# Patient Record
Sex: FEMALE | Race: WHITE | NOT HISPANIC OR LATINO
[De-identification: names, ages, dates, MRNs, and addresses within clinical notes are randomized per-mention and may not be internally consistent; named-entity substitution may affect disease eponyms.]

---

## 2023-08-28 PROBLEM — Z80.3 FAMILY HISTORY OF BREAST CANCER: Status: ACTIVE | Noted: 2023-08-28

## 2023-08-28 PROBLEM — Z87.19 HISTORY OF HIATAL HERNIA: Status: RESOLVED | Noted: 2023-08-28 | Resolved: 2023-08-28

## 2023-08-28 PROBLEM — G35 HISTORY OF MULTIPLE SCLEROSIS: Status: RESOLVED | Noted: 2023-08-28 | Resolved: 2023-08-28

## 2023-08-28 PROBLEM — Z80.8 FAMILY HISTORY OF THYROID CANCER: Status: ACTIVE | Noted: 2023-08-28

## 2023-08-28 PROBLEM — Z80.42 FAMILY HISTORY OF PROSTATE CANCER: Status: ACTIVE | Noted: 2023-08-28

## 2023-08-28 PROBLEM — Z86.79 HISTORY OF HYPERTENSION: Status: RESOLVED | Noted: 2023-08-28 | Resolved: 2023-08-28

## 2023-08-28 PROBLEM — Z80.0 FAMILY HISTORY OF COLON CANCER: Status: ACTIVE | Noted: 2023-08-28

## 2023-08-28 PROBLEM — Z86.018 HISTORY OF CAVERNOMA: Status: RESOLVED | Noted: 2023-08-28 | Resolved: 2023-08-28

## 2023-08-28 PROBLEM — Z86.39 HISTORY OF HYPERLIPIDEMIA: Status: RESOLVED | Noted: 2023-08-28 | Resolved: 2023-08-28

## 2023-08-28 PROBLEM — Z00.00 ENCOUNTER FOR PREVENTIVE HEALTH EXAMINATION: Status: ACTIVE | Noted: 2023-08-28

## 2023-08-28 PROBLEM — K57.90 DIVERTICULOSIS: Status: RESOLVED | Noted: 2023-08-28 | Resolved: 2023-08-28

## 2023-08-28 PROBLEM — Z86.79 HISTORY OF CORONARY ARTERY DISEASE: Status: RESOLVED | Noted: 2023-08-28 | Resolved: 2023-08-28

## 2023-08-29 ENCOUNTER — APPOINTMENT (OUTPATIENT)
Dept: HEMATOLOGY ONCOLOGY | Facility: CLINIC | Age: 63
End: 2023-08-29
Payer: COMMERCIAL

## 2023-08-29 VITALS
TEMPERATURE: 96.6 F | OXYGEN SATURATION: 100 % | WEIGHT: 237 LBS | BODY MASS INDEX: 40.46 KG/M2 | HEIGHT: 64 IN | DIASTOLIC BLOOD PRESSURE: 76 MMHG | RESPIRATION RATE: 18 BRPM | SYSTOLIC BLOOD PRESSURE: 119 MMHG | HEART RATE: 60 BPM

## 2023-08-29 DIAGNOSIS — Z82.61 FAMILY HISTORY OF ARTHRITIS: ICD-10-CM

## 2023-08-29 DIAGNOSIS — Z86.79 PERSONAL HISTORY OF OTHER DISEASES OF THE CIRCULATORY SYSTEM: ICD-10-CM

## 2023-08-29 DIAGNOSIS — Z80.42 FAMILY HISTORY OF MALIGNANT NEOPLASM OF PROSTATE: ICD-10-CM

## 2023-08-29 DIAGNOSIS — K57.90 DIVERTICULOSIS OF INTESTINE, PART UNSPECIFIED, W/OUT PERFORATION OR ABSCESS W/OUT BLEEDING: ICD-10-CM

## 2023-08-29 DIAGNOSIS — Z80.3 FAMILY HISTORY OF MALIGNANT NEOPLASM OF BREAST: ICD-10-CM

## 2023-08-29 DIAGNOSIS — G35 MULTIPLE SCLEROSIS: ICD-10-CM

## 2023-08-29 DIAGNOSIS — Z86.018 PERSONAL HISTORY OF OTHER BENIGN NEOPLASM: ICD-10-CM

## 2023-08-29 DIAGNOSIS — Z87.19 PERSONAL HISTORY OF OTHER DISEASES OF THE DIGESTIVE SYSTEM: ICD-10-CM

## 2023-08-29 DIAGNOSIS — Z86.39 PERSONAL HISTORY OF OTHER ENDOCRINE, NUTRITIONAL AND METABOLIC DISEASE: ICD-10-CM

## 2023-08-29 DIAGNOSIS — Z80.0 FAMILY HISTORY OF MALIGNANT NEOPLASM OF DIGESTIVE ORGANS: ICD-10-CM

## 2023-08-29 DIAGNOSIS — Z80.8 FAMILY HISTORY OF MALIGNANT NEOPLASM OF OTHER ORGANS OR SYSTEMS: ICD-10-CM

## 2023-08-29 PROCEDURE — 99205 OFFICE O/P NEW HI 60 MIN: CPT

## 2023-08-29 RX ORDER — FLUTICASONE PROPIONATE 50 UG/1
50 SPRAY, METERED NASAL DAILY
Qty: 1 | Refills: 1 | Status: ACTIVE | COMMUNITY
Start: 2023-08-29

## 2023-08-29 RX ORDER — ESOMEPRAZOLE MAGNESIUM 40 MG/1
40 CAPSULE, DELAYED RELEASE ORAL DAILY
Refills: 0 | Status: ACTIVE | COMMUNITY
Start: 2023-08-29

## 2023-08-29 RX ORDER — TICAGRELOR 90 MG/1
90 TABLET ORAL TWICE DAILY
Refills: 0 | Status: ACTIVE | COMMUNITY
Start: 2023-08-29

## 2023-08-29 RX ORDER — TELMISARTAN 20 MG/1
20 TABLET ORAL DAILY
Refills: 0 | Status: ACTIVE | COMMUNITY
Start: 2023-08-29

## 2023-08-29 RX ORDER — MULTIVIT-MIN/FOLIC/VIT K/LYCOP 400-300MCG
1000 TABLET ORAL DAILY
Refills: 0 | Status: ACTIVE | COMMUNITY
Start: 2023-08-29

## 2023-08-29 RX ORDER — ASPIRIN ENTERIC COATED TABLETS 81 MG 81 MG/1
81 TABLET, DELAYED RELEASE ORAL DAILY
Refills: 0 | Status: ACTIVE | COMMUNITY
Start: 2023-08-29

## 2023-08-29 RX ORDER — ZINC OXIDE 13 %
CREAM (GRAM) TOPICAL
Refills: 0 | Status: ACTIVE | COMMUNITY
Start: 2023-08-29

## 2023-08-29 RX ORDER — FOLIC ACID 1 MG/1
1 TABLET ORAL DAILY
Refills: 0 | Status: ACTIVE | COMMUNITY
Start: 2023-08-29

## 2023-08-29 RX ORDER — MULTIVIT-MIN/FOLIC/VIT K/LYCOP 400-300MCG
25 MCG TABLET ORAL DAILY
Refills: 0 | Status: ACTIVE | COMMUNITY
Start: 2023-08-29

## 2023-08-29 RX ORDER — UBIDECARENONE 200 MG
200 CAPSULE ORAL DAILY
Refills: 0 | Status: ACTIVE | COMMUNITY
Start: 2023-08-29

## 2023-08-29 RX ORDER — NICOTINE POLACRILEX 2 MG
1 GUM BUCCAL
Refills: 0 | Status: ACTIVE | COMMUNITY
Start: 2023-08-29

## 2023-08-29 RX ORDER — PRAVASTATIN SODIUM 80 MG/1
80 TABLET ORAL DAILY
Refills: 0 | Status: ACTIVE | COMMUNITY
Start: 2023-08-29

## 2023-08-29 RX ORDER — METOPROLOL SUCCINATE 25 MG/1
25 TABLET, EXTENDED RELEASE ORAL DAILY
Refills: 0 | Status: ACTIVE | COMMUNITY
Start: 2023-08-29

## 2023-08-29 RX ORDER — MINOXIDIL 2 %
2 SOLUTION, NON-ORAL TOPICAL
Refills: 0 | Status: ACTIVE | COMMUNITY
Start: 2023-08-29

## 2023-08-29 NOTE — HISTORY OF PRESENT ILLNESS
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 Edwards is a 63 year old female with a history of multiple sclerosis, HTN, HLD, CAD s/p stent Jan 2022, appendicitis, diverticulitis presents for an initial consultation for melanoma of the right ankle.  She has had no prior history of skin cancer.  She had a right ankle lesion for 12 years which became flat pink and then later started to raise which was bothering her when wearing footwear.  On 2/16/23, she had a shave biopsy of the right ankle which was positive for melanoma, Breslow thickness 1.6 mm, mitotic index less than 1/mm2.  On 4/6/23, she subsequently underwent wide local excision and SLNB with Dr. Mark Villaseñor at [facility]. 2 out of 2 sentinel right inguinal lymph nodes were positive with extranodal extension. No residual melanoma was identified.  PETCT on 5/6/23 showed likely post-surgical changes in the right foot bridge, right inguinal region and a hypermetabolic 1 cm right thyroid nodule.  MR of the brain on 6/13/23 showed indeterminate left parietal scalp lesion and hematoma of the right frontal lobe, however underlying melanoma could not be excluded.  The right thyroid nodule and left parietal lobe lesion were biopsied and were benign.  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  Repeat PETCT on August 14, 2023 showed no new suspicious lesions.  She has been followed by [facility] Dr. Marline Swenson at [facility]. She deferred adjuvant systemic therapy due to concern of side effects, especially bleeds, heart function.  Referred by Dr. Yari Miranda for second opinion.   She works as . 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 Facial numbness, weakness in hands from MS, no treatment  Jan 2022 cardiac stent April 2022 appendicitis treated with ABX Diverticulitis - treated with abx, developed fever, admitted with IV ABX, complicated by c diff, for which she was admitted again.  Family history of breast cancer - aunt, prostate cancer - father, colon cancer - grandmother, thyroid cancer - sister

## 2023-08-29 NOTE — PHYSICAL EXAM
[Fully active, able to carry on all pre-disease performance without restriction] : Status 0 - Fully active, able to carry on all pre-disease performance without restriction [Normal] : affect appropriate [de-identified] : Well healed surgical scar in the right ankle

## 2023-08-29 NOTE — ASSESSMENT
[FreeTextEntry1] : This is a 63 year old female who is now IVAN from a T2aN2a, stage IIIA, melanoma arising from the right ankle since 04/06/2023.  Her tumor was VE1 positive.    I had a long discussion with the patient and her  regarding her complex medical history, her melanoma history, and management options.  I discussed the nuances of stage IIIA melanoma and how distinct pathologic findings may modify our consideration of adjuvant therapy.  I reviewed the data supporting one year of adjuvant anti-PD1 therapy or one year of adjuvant dabrafenib and trametinib in this setting.   Despite her history of MS, the use of checkpoint blockade is not absolutely contraindicated and, certainly in the metastatic setting, would be a reasonable consideration.  In the adjuvant setting, my bias would be for dabrafenib and trametinib should she choose to pursue active therapy.  I reviewed the side effect profile of this regimen and the use of dose holds or modifications to mitigate any adverse events.  After a long discussion, all of her questions and concerns were answered to her satisfaction.  She expressed interest in proceeding with adjuvant dabrafenib and trametinib and will be seeing Dr. Swenson in the near future.  She knows she is always welcome to return to this clinic should she have any questions or concerns in the future.

## 2023-09-14 ENCOUNTER — RESULT REVIEW (OUTPATIENT)
Age: 63
End: 2023-09-14

## 2023-09-15 ENCOUNTER — NON-APPOINTMENT (OUTPATIENT)
Age: 63
End: 2023-09-15

## 2023-09-15 ENCOUNTER — RESULT REVIEW (OUTPATIENT)
Age: 63
End: 2023-09-15

## 2023-09-19 ENCOUNTER — APPOINTMENT (OUTPATIENT)
Dept: HEMATOLOGY ONCOLOGY | Facility: CLINIC | Age: 63
End: 2023-09-19
Payer: COMMERCIAL

## 2023-09-19 PROCEDURE — 99215 OFFICE O/P EST HI 40 MIN: CPT | Mod: 95

## 2023-09-19 RX ORDER — GLUCOSA SU 2KCL/CHONDROITIN SU 500-400 MG
500 CAPSULE ORAL DAILY
Refills: 0 | Status: DISCONTINUED | COMMUNITY
Start: 2023-08-29 | End: 2023-09-19

## 2023-09-25 ENCOUNTER — TRANSCRIPTION ENCOUNTER (OUTPATIENT)
Age: 63
End: 2023-09-25

## 2023-09-27 ENCOUNTER — APPOINTMENT (OUTPATIENT)
Dept: HEMATOLOGY ONCOLOGY | Facility: CLINIC | Age: 63
End: 2023-09-27
Payer: COMMERCIAL

## 2023-09-27 PROCEDURE — 99499A: CUSTOM | Mod: NC

## 2023-10-10 ENCOUNTER — APPOINTMENT (OUTPATIENT)
Dept: HEMATOLOGY ONCOLOGY | Facility: CLINIC | Age: 63
End: 2023-10-10
Payer: COMMERCIAL

## 2023-10-10 VITALS
RESPIRATION RATE: 18 BRPM | OXYGEN SATURATION: 98 % | DIASTOLIC BLOOD PRESSURE: 82 MMHG | BODY MASS INDEX: 39.78 KG/M2 | SYSTOLIC BLOOD PRESSURE: 142 MMHG | HEIGHT: 64 IN | HEART RATE: 63 BPM | WEIGHT: 233 LBS

## 2023-10-10 DIAGNOSIS — R74.8 ABNORMAL LEVELS OF OTHER SERUM ENZYMES: ICD-10-CM

## 2023-10-10 LAB
ALBUMIN SERPL ELPH-MCNC: 4.1 G/DL
ALP BLD-CCNC: 115 U/L
ALT SERPL-CCNC: 19 U/L
ANION GAP SERPL CALC-SCNC: 15 MMOL/L
AST SERPL-CCNC: 31 U/L
BASOPHILS # BLD AUTO: 0.04 K/UL
BASOPHILS NFR BLD AUTO: 0.5 %
BILIRUB SERPL-MCNC: <0.2 MG/DL
BUN SERPL-MCNC: 15 MG/DL
CALCIUM SERPL-MCNC: 9.6 MG/DL
CHLORIDE SERPL-SCNC: 99 MMOL/L
CK SERPL-CCNC: 455 U/L
CO2 SERPL-SCNC: 24 MMOL/L
CREAT SERPL-MCNC: 0.64 MG/DL
EGFR: 100 ML/MIN/1.73M2
EOSINOPHIL # BLD AUTO: 0.01 K/UL
EOSINOPHIL NFR BLD AUTO: 0.1 %
GLUCOSE SERPL-MCNC: 42 MG/DL
HCT VFR BLD CALC: 39.3 %
HGB BLD-MCNC: 12.3 G/DL
IMM GRANULOCYTES NFR BLD AUTO: 0.3 %
LDH SERPL-CCNC: 234 U/L
LYMPHOCYTES # BLD AUTO: 2.69 K/UL
LYMPHOCYTES NFR BLD AUTO: 35.6 %
MAN DIFF?: NORMAL
MCHC RBC-ENTMCNC: 28.1 PG
MCHC RBC-ENTMCNC: 31.3 GM/DL
MCV RBC AUTO: 89.9 FL
MONOCYTES # BLD AUTO: 0.87 K/UL
MONOCYTES NFR BLD AUTO: 11.5 %
NEUTROPHILS # BLD AUTO: 3.93 K/UL
NEUTROPHILS NFR BLD AUTO: 52 %
PLATELET # BLD AUTO: 295 K/UL
POTASSIUM SERPL-SCNC: 3.9 MMOL/L
PROT SERPL-MCNC: 7.7 G/DL
RBC # BLD: 4.37 M/UL
RBC # FLD: 14.5 %
SODIUM SERPL-SCNC: 138 MMOL/L
WBC # FLD AUTO: 7.56 K/UL

## 2023-10-10 PROCEDURE — 99214 OFFICE O/P EST MOD 30 MIN: CPT

## 2023-10-11 PROBLEM — R74.8 ELEVATED CREATINE KINASE LEVEL: Status: ACTIVE | Noted: 2023-10-11

## 2023-10-13 ENCOUNTER — NON-APPOINTMENT (OUTPATIENT)
Age: 63
End: 2023-10-13

## 2023-10-20 ENCOUNTER — NON-APPOINTMENT (OUTPATIENT)
Age: 63
End: 2023-10-20

## 2023-10-20 ENCOUNTER — RESULT REVIEW (OUTPATIENT)
Age: 63
End: 2023-10-20

## 2023-10-24 ENCOUNTER — APPOINTMENT (OUTPATIENT)
Dept: HEMATOLOGY ONCOLOGY | Facility: CLINIC | Age: 63
End: 2023-10-24
Payer: COMMERCIAL

## 2023-10-24 VITALS
TEMPERATURE: 98.9 F | DIASTOLIC BLOOD PRESSURE: 86 MMHG | HEIGHT: 64 IN | HEART RATE: 67 BPM | RESPIRATION RATE: 18 BRPM | SYSTOLIC BLOOD PRESSURE: 142 MMHG | WEIGHT: 233 LBS | BODY MASS INDEX: 39.78 KG/M2 | OXYGEN SATURATION: 97 %

## 2023-10-24 DIAGNOSIS — R93.89 ABNORMAL FINDINGS ON DIAGNOSTIC IMAGING OF OTHER SPECIFIED BODY STRUCTURES: ICD-10-CM

## 2023-10-24 DIAGNOSIS — Z78.9 OTHER SPECIFIED HEALTH STATUS: ICD-10-CM

## 2023-10-24 PROCEDURE — 99214 OFFICE O/P EST MOD 30 MIN: CPT

## 2023-10-31 ENCOUNTER — APPOINTMENT (OUTPATIENT)
Dept: SURGERY | Facility: CLINIC | Age: 63
End: 2023-10-31
Payer: COMMERCIAL

## 2023-10-31 ENCOUNTER — APPOINTMENT (OUTPATIENT)
Dept: HEMATOLOGY ONCOLOGY | Facility: CLINIC | Age: 63
End: 2023-10-31
Payer: COMMERCIAL

## 2023-10-31 VITALS
BODY MASS INDEX: 39.27 KG/M2 | HEART RATE: 75 BPM | WEIGHT: 230 LBS | HEIGHT: 64 IN | DIASTOLIC BLOOD PRESSURE: 84 MMHG | SYSTOLIC BLOOD PRESSURE: 124 MMHG

## 2023-10-31 PROCEDURE — 99442: CPT

## 2023-10-31 PROCEDURE — 99204 OFFICE O/P NEW MOD 45 MIN: CPT

## 2023-11-19 ENCOUNTER — RESULT REVIEW (OUTPATIENT)
Age: 63
End: 2023-11-19

## 2023-11-21 ENCOUNTER — APPOINTMENT (OUTPATIENT)
Dept: HEMATOLOGY ONCOLOGY | Facility: CLINIC | Age: 63
End: 2023-11-21
Payer: COMMERCIAL

## 2023-11-21 PROCEDURE — 99214 OFFICE O/P EST MOD 30 MIN: CPT | Mod: 95

## 2023-11-27 ENCOUNTER — APPOINTMENT (OUTPATIENT)
Dept: COLORECTAL SURGERY | Facility: CLINIC | Age: 63
End: 2023-11-27
Payer: COMMERCIAL

## 2023-11-27 ENCOUNTER — NON-APPOINTMENT (OUTPATIENT)
Age: 63
End: 2023-11-27

## 2023-11-27 VITALS — DIASTOLIC BLOOD PRESSURE: 81 MMHG | SYSTOLIC BLOOD PRESSURE: 135 MMHG | HEART RATE: 88 BPM

## 2023-11-27 VITALS
SYSTOLIC BLOOD PRESSURE: 79 MMHG | HEART RATE: 70 BPM | HEIGHT: 63 IN | DIASTOLIC BLOOD PRESSURE: 51 MMHG | BODY MASS INDEX: 41.46 KG/M2 | WEIGHT: 234 LBS | RESPIRATION RATE: 18 BRPM | TEMPERATURE: 100.3 F | OXYGEN SATURATION: 99 %

## 2023-11-27 VITALS — HEART RATE: 70 BPM | DIASTOLIC BLOOD PRESSURE: 59 MMHG | SYSTOLIC BLOOD PRESSURE: 91 MMHG

## 2023-11-27 PROCEDURE — 99204 OFFICE O/P NEW MOD 45 MIN: CPT

## 2023-11-28 ENCOUNTER — NON-APPOINTMENT (OUTPATIENT)
Age: 63
End: 2023-11-28

## 2023-11-29 ENCOUNTER — RESULT REVIEW (OUTPATIENT)
Age: 63
End: 2023-11-29

## 2023-11-30 ENCOUNTER — NON-APPOINTMENT (OUTPATIENT)
Age: 63
End: 2023-11-30

## 2023-11-30 DIAGNOSIS — R11.0 NAUSEA: ICD-10-CM

## 2023-11-30 DIAGNOSIS — T45.1X5A NAUSEA: ICD-10-CM

## 2023-11-30 RX ORDER — ONDANSETRON 8 MG/1
8 TABLET ORAL EVERY 8 HOURS
Qty: 30 | Refills: 3 | Status: ACTIVE | COMMUNITY
Start: 2023-11-30 | End: 1900-01-01

## 2023-12-05 ENCOUNTER — APPOINTMENT (OUTPATIENT)
Dept: HEMATOLOGY ONCOLOGY | Facility: CLINIC | Age: 63
End: 2023-12-05
Payer: COMMERCIAL

## 2023-12-05 DIAGNOSIS — Z87.891 PERSONAL HISTORY OF NICOTINE DEPENDENCE: ICD-10-CM

## 2023-12-05 DIAGNOSIS — F17.200 NICOTINE DEPENDENCE, UNSPECIFIED, UNCOMPLICATED: ICD-10-CM

## 2023-12-05 PROCEDURE — 99214 OFFICE O/P EST MOD 30 MIN: CPT | Mod: 95

## 2023-12-08 ENCOUNTER — NON-APPOINTMENT (OUTPATIENT)
Age: 63
End: 2023-12-08

## 2023-12-12 ENCOUNTER — APPOINTMENT (OUTPATIENT)
Dept: HEMATOLOGY ONCOLOGY | Facility: CLINIC | Age: 63
End: 2023-12-12
Payer: COMMERCIAL

## 2023-12-12 PROCEDURE — 99213 OFFICE O/P EST LOW 20 MIN: CPT | Mod: 95

## 2023-12-14 ENCOUNTER — NON-APPOINTMENT (OUTPATIENT)
Age: 63
End: 2023-12-14

## 2023-12-14 RX ORDER — GABAPENTIN 300 MG/1
300 CAPSULE ORAL 3 TIMES DAILY
Qty: 90 | Refills: 0 | Status: ACTIVE | COMMUNITY
Start: 2023-12-14 | End: 1900-01-01

## 2023-12-14 RX ORDER — OXYCODONE 5 MG/1
5 TABLET ORAL EVERY 6 HOURS
Qty: 12 | Refills: 0 | Status: ACTIVE | COMMUNITY
Start: 2023-12-14 | End: 1900-01-01

## 2023-12-19 ENCOUNTER — APPOINTMENT (OUTPATIENT)
Dept: HEMATOLOGY ONCOLOGY | Facility: CLINIC | Age: 63
End: 2023-12-19
Payer: COMMERCIAL

## 2023-12-19 VITALS
RESPIRATION RATE: 18 BRPM | TEMPERATURE: 97.1 F | BODY MASS INDEX: 24.45 KG/M2 | OXYGEN SATURATION: 97 % | SYSTOLIC BLOOD PRESSURE: 133 MMHG | DIASTOLIC BLOOD PRESSURE: 82 MMHG | HEART RATE: 97 BPM | HEIGHT: 63 IN | WEIGHT: 138 LBS

## 2023-12-19 PROCEDURE — 99215 OFFICE O/P EST HI 40 MIN: CPT

## 2023-12-20 NOTE — HISTORY OF PRESENT ILLNESS
[T: ___] : T[unfilled] [N: ___] : N[unfilled] [AJCC Stage: ____] : AJCC Stage: [unfilled] [de-identified] : DIAGNOSIS:  Stage IIIA (T2aN2a) melanoma of right ankle  MOLECULAR FINDINGS: IHC positive PRAME, SOX10, Melan-A BRAF V600E positive  CURRENT THERAPY:   Dabrafenib/trametinib (09/22/2023-present)  INTERVAL HISTORY: Mrs. Edwards is a 63 year old female with a history of multiple sclerosis, HTN, HLD, CAD s/p stent Jan 2022, appendicitis, diverticulitis presents for continued management of her T2aN2a, stage IIIA, cutaneous melanoma arising from the right ankle.  Please see my prior notes for her complex history.  Briefly, she has had no prior history of skin cancer.  She had a right ankle lesion for 12 years which became flat pink and then later started to raise which was bothering her when wearing footwear.  On 2/16/23, she had a shave biopsy of the right ankle which was positive for melanoma, Breslow thickness 1.6 mm, mitotic index less than 1/mm2.  On 4/6/23, she subsequently underwent wide local excision and SLNB with Dr. Mark Villaseñor at [facility]. 2 out of 2 sentinel right inguinal lymph nodes were positive with extranodal extension. No residual melanoma was identified.  PETCT on 5/6/23 showed likely post-surgical changes in the right foot bridge, right inguinal region and a hypermetabolic 1 cm right thyroid nodule.  MR of the brain on 6/13/23 showed indeterminate left parietal scalp lesion and hematoma of the right frontal lobe, however underlying melanoma could not be excluded.  The right thyroid nodule and left parietal lobe lesion were biopsied and were benign.  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  Repeat PETCT on August 14, 2023 showed no new suspicious lesions.  She has been followed by [facility] Dr. Marline Swenson at [facility]. She deferred adjuvant systemic therapy due to concern of side effects, especially bleeds, heart function.  She was referred by Dr. Yari Miranda for second opinion.  After a long discussion, she decided to proceed with dabrafenib/trametinib and transfer care to us.  She has been on dabrafenib/trametinib since 9/22/23. She reported headaches the first two days, took Tylenol x 1 with relief. 10/6/23 EKG showed normal sinus rhythm. She was having mild back pain for 2 days when it increased to 7/10 VAS pain last night. The pain moved to the front and she was concerned about recurrent appendicitis or diverticulitis and went to [facility] ED on 10/23/23. Repeat EKG 10/23/23 showed no significant change. CT abdomen and pelvis showed a fluid-filled and dilated distal appendix (9 mm) and she was prescribed cefdinir and metronidazole. CT of the lumbar spine showed multilevel degenerative changes and questionable soft tissue thickening along the dural layer. She also reported fevers of 101-102.5 with chills and dabrafenib/trametinib were held for the past week.  10/25/23 EKG showed NSR and no significant changes. She completed the antibiotics on 10/29/23 and reported severe back pain throughout the week until Thursday, mostly at night and then continued discomfort through the weekend. She restarted dabrafenib/trametinib yesterday (10/30/23). The MRI of the lumbar spine was performed on 11/5/23, showing degenerative changes.  11/17/23 EKG showed NSR with no significant changes and ECHO showed LVEF of 63%.  On 11/28/23, pt reported rigor episodes 2-3 times a day, vomiting twice a day, increased fatigue, sleeping 12 hours a day, all day sweats and night chills, Tmax 100.3 on 11/27.  She was advised to take Tylenol prn and hold the medications until symptoms improve. She consulted with a surgeon (Dr. Jones) who recommended appendectomy with or without cecopexy to treat her cecal bascule (Type III cecal volvulus).  She saw Jarred Owen for a second opinion.  Dr. Owen stated her history was inconsistent with appendicitis; however, she may have a mucocele that may need elective removal.  Inguinal ultrasound performed on 11/29/23 was negative for lymphadenopathy in the groin.  She reinitiated therapy on 12/3/23 but then once again developed fevers and rigors and was sent home from work 12/5/23.  She restarted 12/8/23 evening.  Reported rigors lasting 1 hours 3 hours after meds with night sweats and vomiting.  Rigors the next morning 3 hours after the Saturday morning dose.  She had been taking Tylenol and Benadryl and no longer has rigors or night sweats since Sunday.  She reports back pain with some left leg and left hand numbness from the rigors.  She stopped taking treatment on 12/13/23 due to increased back pain, numbness and discomfort affecting sleep.  MRI head was ordered but next available appt is 12/23.  Oxycodone 5mg and gabapentin were prescribed for symptom management and she was to continue Tylenol. However she had not filled the prescriptions and instead reached out to her neurologist who referred her to the ED given her severe back pain, altered gait, and left upper and lower extremity neuropathy.  She was at [facility] 12/14-12/16. Brain MRI, performed on 12/14/2023, demonstrated a 1.6 cm cavernous malformation in the anterolateral right frontal lobe with white matter disease suggesting chronic sequelae of multiple sclerosis. MRI of the cervical, thoracic and lumbar spine, performed on 12/14/2023, was negative for significant central canal stenosis or cord impingement.  She had a telemed visit with Dr. Miranda on 12/18.  She did not rule out impingement and may schedule an EMG in January.  She also considered possible multiple sclerosis flare-up, severe pain due to strained muscle due to the rigors.  At this time, she reports improved gait and back pain, gait still slightly abnormal although she can only go up and down stairs one at a time.  She has not had pain meds since Saturday night.  Finally sleeping through night.  Neuropathy of hands and numbness of right thigh continue.  PAST MEDICAL HISTORY: Facial numbness, weakness in hands from MS, no treatment  Jan 2022 cardiac stent April 2022 appendicitis treated with ABX Diverticulitis - treated with abx, developed fever, admitted with IV ABX, complicated by c diff, for which she was admitted again.  SOCIAL HISTORY: She works as . 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  Family history of breast cancer - aunt, prostate cancer - father, colon cancer - grandmother, thyroid cancer - sister

## 2023-12-20 NOTE — ASSESSMENT
[FreeTextEntry1] :  63 year old female with a history of MS who is now IVAN from a T2aN2a, stage IIIA, melanoma arising from the right ankle since 04/06/2023. Her tumor was BRAF V600E positive.  She initiated therapy with adjuvant dabrafenib and trametinib on 09/22/2023.  Her course has been complicated by the development of abdominal pain thought to be due to a cecal bascule. She saw Jarred Owen for a 2nd opinion who stated that her history is inconsistent with appendicits, however she may have a mucocele which may need elective removal.  She has also had recurrent fevers and rigors necessitating treatment breaks.  On 11/28/23, she reported chills, sweats, rigors, vomiting, fatigue, and was advised to hold dabrafenib and trametinib until symptoms improve.  She restarted therapy on Sunday with the development of recurrent symptoms.  We placed her back on a treatment break and will follow up with her on Friday.  She restarted treatment on 12/8/23 with prophylactic tylenol and benadryl with improving rigors, night sweats and vomiting, but reports back pain from the rigors. She stopped treatment again on 12/13 due to severe back pain, left-side neuropathy, and altered gait.  She was admitted to [facility] 12/14-2/16, and is following with her neurologist.  We will follow up with her in one weeks time.  Should she have recurrent symptoms, we will consider a trial of steroids and/or a dose modification of the dabrafenib to 100 mg BID combined with trametinib 2 mg QD. If her pyrexia syndrome does not resolve, we discussed switching to encorafenib / binimetinib which have a different side effect profile.  Plan - treatment break from dabrafenib/trametinib - will have nurse follow-up with a phone call to assess her for symptomatic improvement - Advised her to premedicate with standing tylenol and benadryl  q3h, 12-24 hours prior to restarting dabrafenib/trametinib. - Gabapentin  300mg at bedtime for neuropathy - will refer to Dr. Molina for assistance with symptom management if there is no observed improvement - RTC for follow up in 2 weeks

## 2023-12-20 NOTE — PHYSICAL EXAM
[Restricted in physically strenuous activity but ambulatory and able to carry out work of a light or sedentary nature] : Status 1- Restricted in physically strenuous activity but ambulatory and able to carry out work of a light or sedentary nature, e.g., light house work, office work [Normal] : normoactive bowel sounds, soft and nontender, no hepatosplenomegaly or masses appreciated [de-identified] : no point tenderness [de-identified] : AAOx3; coordination intact, no dysmetria, strength 5/5 in bilateral upper and lower extremities; diminished sensation on LLE (from L hip to just underneath L knee), gait unsteady

## 2023-12-20 NOTE — REVIEW OF SYSTEMS
[Diarrhea: Grade 0] : Diarrhea: Grade 0 [Negative] : Allergic/Immunologic [Difficulty Walking] : difficulty walking [FreeTextEntry2] : rigors [FreeTextEntry9] : back pain, LLE numbness [de-identified] : neuropathy in fingers L>R, diminished sensation L hip to anterior thigh

## 2024-01-02 ENCOUNTER — APPOINTMENT (OUTPATIENT)
Dept: HEMATOLOGY ONCOLOGY | Facility: CLINIC | Age: 64
End: 2024-01-02
Payer: COMMERCIAL

## 2024-01-02 ENCOUNTER — APPOINTMENT (OUTPATIENT)
Dept: HEMATOLOGY ONCOLOGY | Facility: CLINIC | Age: 64
End: 2024-01-02

## 2024-01-02 PROCEDURE — 99213 OFFICE O/P EST LOW 20 MIN: CPT | Mod: 95

## 2024-01-02 NOTE — REASON FOR VISIT
[Home] : at home, [unfilled] , at the time of the visit. [Medical Office: ([facility])___] : at the medical office located in  [Patient] : the patient [This encounter was initiated by telehealth (audio with video) and converted to telephone (audio only) due to technical difficulties.] : This encounter was initiated by telehealth (audio with video) and converted to telephone (audio only) due to technical difficulties. [Follow-Up Visit] : a follow-up

## 2024-01-02 NOTE — ASSESSMENT
[FreeTextEntry1] : Mrs. Edwards is a 63 year old female with a history of MS who is now IVAN from a T2aN2a, stage IIIA, melanoma arising from the right ankle since 04/06/2023. Her tumor was BRAF V600E positive.  She initiated therapy with adjuvant dabrafenib and trametinib on 09/22/2023.  Her course has been complicated by the development of abdominal pain thought to be due to a cecal bascule. She saw Jarred Owen for a 2nd opinion who stated that her history is inconsistent with appendicits, however she may have a mucocele which may need elective removal.  She has also had recurrent fevers and rigors necessitating treatment breaks.  At this time, she appears to be doing well on treatment.  She will have repeat bloodwork performed locally tomorrow.  We will plan to see her back here in 3 weeks time with repeat bloodwork and an ECG.  Should she have recurrent symptoms, although we could consider a trial of steroids and/or a dose modification of the dabrafenib to 100 mg BID combined with trametinib 2 mg QD, I anticpate changing to encorafenib and binimetinib.

## 2024-01-02 NOTE — HISTORY OF PRESENT ILLNESS
[T: ___] : T[unfilled] [N: ___] : N[unfilled] [AJCC Stage: ____] : AJCC Stage: [unfilled] [de-identified] : DIAGNOSIS:  Stage IIIA (T2aN2a) melanoma of right ankle  MOLECULAR FINDINGS: IHC positive PRAME, SOX10, Melan-A BRAF V600E positive  CURRENT THERAPY:   Dabrafenib/trametinib (09/22/2023-present)  INTERVAL HISTORY: Mrs. Edwards is a 63 year old female with a history of multiple sclerosis, HTN, HLD, CAD s/p stent Jan 2022, appendicitis, diverticulitis presents for continued management of her T2aN2a, stage IIIA, cutaneous melanoma arising from the right ankle.  Please see my prior notes for her complex history.  Briefly, she has had no prior history of skin cancer.  She had a right ankle lesion for 12 years which became flat pink and then later started to raise which was bothering her when wearing footwear.  On 2/16/23, she had a shave biopsy of the right ankle which was positive for melanoma, Breslow thickness 1.6 mm, mitotic index less than 1/mm2.  On 4/6/23, she subsequently underwent wide local excision and SLNB with Dr. Mark Villaseñor at [facility] [place]. 2 out of 2 sentinel right inguinal lymph nodes were positive with extranodal extension. No residual melanoma was identified.  PETCT on 5/6/23 showed likely post-surgical changes in the right foot bridge, right inguinal region and a hypermetabolic 1 cm right thyroid nodule.  MR of the brain on 6/13/23 showed indeterminate left parietal scalp lesion and hematoma of the right frontal lobe, however underlying melanoma could not be excluded.  The right thyroid nodule and left parietal lobe lesion were biopsied and were benign.  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  Repeat PETCT on August 14, 2023 showed no new suspicious lesions.  She has been followed by [facility] Dr. Marline Swenson at [facility]. She deferred adjuvant systemic therapy due to concern of side effects, especially bleeds, heart function.  She was referred by Dr. Yari Miranda for second opinion.  After a long discussion, she decided to proceed with dabrafenib/trametinib and transfer care to us.  She has been on dabrafenib/trametinib since 9/22/23. She reported headaches the first two days, took Tylenol x 1 with relief. 10/6/23 EKG showed normal sinus rhythm. She was having mild back pain for 2 days when it increased to 7/10 VAS pain last night. The pain moved to the front and she was concerned about recurrent appendicitis or diverticulitis and went to [facility] ED on 10/23/23. Repeat EKG 10/23/23 showed no significant change. CT abdomen and pelvis showed a fluid-filled and dilated distal appendix (9 mm) and she was prescribed cefdinir and metronidazole. CT of the lumbar spine showed multilevel degenerative changes and questionable soft tissue thickening along the dural layer. She also reported fevers of 101-102.5 with chills and dabrafenib/trametinib were held for the past week.  10/25/23 EKG showed NSR and no significant changes. She completed the antibiotics on 10/29/23 and reported severe back pain throughout the week until Thursday, mostly at night and then continued discomfort through the weekend. She restarted dabrafenib/trametinib yesterday (10/30/23). The MRI of the lumbar spine was performed on 11/5/23, showing degenerative changes.  11/17/23 EKG showed NSR with no significant changes and ECHO showed LVEF of 63%.  On 11/28/23, pt reported rigor episodes 2-3 times a day, vomiting twice a day, increased fatigue, sleeping 12 hours a day, all day sweats and night chills, Tmax 100.3 on 11/27.  She was advised to take Tylenol prn and hold the medications until symptoms improve. She consulted with a surgeon (Dr. Jones) who recommended appendectomy with or without cecopexy to treat her cecal bascule (Type III cecal volvulus).  She saw Jarred Owen for a second opinion.  Dr. Owen stated her history was inconsistent with appendicitis; however, she may have a mucocele that may need elective removal.  Inguinal ultrasound performed on 11/29/23 was negative for lymphadenopathy in the groin.  She reinitiated therapy on 12/3/23 but then once again developed fevers and rigors and was sent home from work 12/5/23.  She restarted 12/8/23 evening.  Reported rigors lasting 1 hours 3 hours after meds with night sweats and vomiting.  Rigors the next morning 3 hours after the Saturday morning dose.  She had been taking Tylenol and Benadryl and no longer has rigors or night sweats since Sunday.  She reports back pain with some left leg and left hand numbness from the rigors.  She stopped taking treatment on 12/13/23 due to increased back pain, numbness and discomfort affecting sleep.  MRI head was ordered but next available appt is 12/23.  Oxycodone 5mg and gabapentin were prescribed for symptom management and she was to continue Tylenol. She had an appointment scheduled with her neurologist.  She spoke to her neurologist due to severe back pain, altered gait and left arm and left neuropathy and was advised to go to the ED.  She was at [facility] 12/14-12/16. Brain MRI, performed on 12/14/2023, demonstrated a 1.6 cm cavernous malformation in the anterolateral right frontal lobe with white matter disease suggesting chronic sequelae of multiple sclerosis. MRI of the cervical, thoracic and lumbar spine, performed on 12/14/2023, was negative for significant central canal stenosis or cord impingement.  She had a telemed visit with Dr. Miranda on 12/18.  She did not rule out impingement and may schedule an EMG in January.  She also considered possible multiple sclerosis flare-up, severe pain due to strained muscle due to the rigors.  Since she was last seen here, she started the gabapentin 300 mg qhs with improvement in her neuropathy of the hands and feet.  She has not had any further back pain and her gait has improved.  She reinitiated the dabrafenib and trametinib on 12/26/2023 along with tylenol and benadryl, with only one episode of mild chills.  She is excited to begin teaching again tomorrow.  PAST MEDICAL HISTORY: Facial numbness, weakness in hands from MS, no treatment  Jan 2022 cardiac stent April 2022 appendicitis treated with ABX Diverticulitis - treated with abx, developed fever, admitted with IV ABX, complicated by c diff, for which she was admitted again.  SOCIAL HISTORY: She works as . 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  Family history of breast cancer - aunt, prostate cancer - father, colon cancer - grandmother, thyroid cancer - sister

## 2024-01-15 ENCOUNTER — APPOINTMENT (OUTPATIENT)
Dept: AFTER HOURS CARE | Facility: EMERGENCY ROOM | Age: 64
End: 2024-01-15
Payer: COMMERCIAL

## 2024-01-15 DIAGNOSIS — R50.9 FEVER, UNSPECIFIED: ICD-10-CM

## 2024-01-15 PROCEDURE — 99203 OFFICE O/P NEW LOW 30 MIN: CPT | Mod: 95

## 2024-01-16 RX ORDER — DABRAFENIB 75 MG/1
75 CAPSULE ORAL TWICE DAILY
Qty: 120 | Refills: 5 | Status: DISCONTINUED | COMMUNITY
Start: 2023-08-29 | End: 2024-01-16

## 2024-01-17 PROBLEM — R50.9 FEVER: Status: ACTIVE | Noted: 2024-01-17

## 2024-01-17 RX ORDER — TRAMETINIB 2 MG/1
2 TABLET, FILM COATED ORAL DAILY
Qty: 30 | Refills: 5 | Status: DISCONTINUED | COMMUNITY
Start: 2023-08-29 | End: 2024-01-17

## 2024-01-17 RX ORDER — DABRAFENIB 50 MG/1
50 CAPSULE ORAL TWICE DAILY
Qty: 120 | Refills: 2 | Status: DISCONTINUED | COMMUNITY
Start: 2024-01-16 | End: 2024-01-17

## 2024-01-17 NOTE — PLAN
[FreeTextEntry1] : 62 yo f with fever, possibly medication induced.  Patient told to call Dr. Paris tomorrow and inform him of fevers and next steps.  She is nontoxic, well appearing and denies chest pain or sob. Return precautions discussed.

## 2024-01-17 NOTE — PHYSICAL EXAM
[TextEntry] : PLEASE SEE HPI FOR ADDITIONAL RELEVANT PHYSICAL EXAM FINDINGS GENERAL: no acute distress, nontoxic HEAD: normocephalic EYES: no scleral icterus NECK: trachea midline, Full ROM RESP: no respiratory distress ABD: nondistended MSK: no gross deformity NEURO: alert & fully oriented SKIN: no rash PSYCH: cooperative, good insight, appropriate, fluent speech

## 2024-01-17 NOTE — HISTORY OF PRESENT ILLNESS
[Home] : at home, [unfilled] , at the time of the visit. [Other Location: e.g. Home (Enter Location, City,State)___] : at [unfilled] [Verbal consent obtained from patient] : the patient, [unfilled] [FreeTextEntry8] : 64 yo f with metastatic melanoma presents with fever.  Per patient she stopped her medications (mekinist and tafinilar) previously due to fever and was restarted on them on 12/26.  She has now developed another fever and rigors and presents for evaluation.  TMax is [address]ham. is Dr. Hdez. She is also on a statin, aspirin, metoprolol, telmisartan and brillinta.

## 2024-01-18 ENCOUNTER — NON-APPOINTMENT (OUTPATIENT)
Age: 64
End: 2024-01-18

## 2024-01-22 ENCOUNTER — NON-APPOINTMENT (OUTPATIENT)
Age: 64
End: 2024-01-22

## 2024-01-24 ENCOUNTER — APPOINTMENT (OUTPATIENT)
Dept: HEMATOLOGY ONCOLOGY | Facility: CLINIC | Age: 64
End: 2024-01-24
Payer: COMMERCIAL

## 2024-01-24 PROCEDURE — 99213 OFFICE O/P EST LOW 20 MIN: CPT | Mod: 95

## 2024-01-25 ENCOUNTER — NON-APPOINTMENT (OUTPATIENT)
Age: 64
End: 2024-01-25

## 2024-01-29 ENCOUNTER — NON-APPOINTMENT (OUTPATIENT)
Age: 64
End: 2024-01-29

## 2024-01-30 ENCOUNTER — APPOINTMENT (OUTPATIENT)
Dept: HEMATOLOGY ONCOLOGY | Facility: CLINIC | Age: 64
End: 2024-01-30
Payer: COMMERCIAL

## 2024-01-30 DIAGNOSIS — M54.9 DORSALGIA, UNSPECIFIED: ICD-10-CM

## 2024-01-30 PROCEDURE — 99213 OFFICE O/P EST LOW 20 MIN: CPT | Mod: 95

## 2024-01-30 NOTE — HISTORY OF PRESENT ILLNESS
[T: ___] : T[unfilled] [N: ___] : N[unfilled] [AJCC Stage: ____] : AJCC Stage: [unfilled] [de-identified] : DIAGNOSIS:  Stage IIIA (T2aN2a) melanoma of right ankle  MOLECULAR FINDINGS: IHC positive PRAME, SOX10, Melan-A BRAF V600E positive  CURRENT THERAPY:   Dabrafenib/trametinib (09/22/2023-present)  INTERVAL HISTORY: Mrs. Edwards is a 63 year old female with a history of multiple sclerosis, HTN, HLD, CAD s/p stent Jan 2022, appendicitis, diverticulitis presents for continued management of her T2aN2a, stage IIIA, cutaneous melanoma arising from the right ankle.  Please see my prior notes for her complex history.  Briefly, she has had no prior history of skin cancer.  She had a right ankle lesion for 12 years which became flat pink and then later started to raise which was bothering her when wearing footwear.  On 2/16/23, she had a shave biopsy of the right ankle which was positive for melanoma, Breslow thickness 1.6 mm, mitotic index less than 1/mm2.  On 4/6/23, she subsequently underwent wide local excision and SLNB with Dr. Mark Villaseñor at [facility] [place]. 2 out of 2 sentinel right inguinal lymph nodes were positive with extranodal extension. No residual melanoma was identified.  PETCT on 5/6/23 showed likely post-surgical changes in the right foot bridge, right inguinal region and a hypermetabolic 1 cm right thyroid nodule.  MR of the brain on 6/13/23 showed indeterminate left parietal scalp lesion and hematoma of the right frontal lobe, however underlying melanoma could not be excluded.  The right thyroid nodule and left parietal lobe lesion were biopsied and were benign.  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  Repeat PETCT on August 14, 2023 showed no new suspicious lesions.  She has been followed by [facility] Dr. Marline Swenson at [facility]. She deferred adjuvant systemic therapy due to concern of side effects, especially bleeds, heart function.  She was referred by Dr. Yari Miranda for second opinion.  After a long discussion, she decided to proceed with dabrafenib/trametinib and transfer care to us.  She has been on dabrafenib/trametinib since 9/22/23. She reported headaches the first two days, took Tylenol x 1 with relief. 10/6/23 EKG showed normal sinus rhythm. She was having mild back pain for 2 days when it increased to 7/10 VAS pain last night. The pain moved to the front and she was concerned about recurrent appendicitis or diverticulitis and went to [facility] ED on 10/23/23. Repeat EKG 10/23/23 showed no significant change. CT abdomen and pelvis showed a fluid-filled and dilated distal appendix (9 mm) and she was prescribed cefdinir and metronidazole. CT of the lumbar spine showed multilevel degenerative changes and questionable soft tissue thickening along the dural layer. She also reported fevers of 101-102.5 with chills and dabrafenib/trametinib were held for the past week.  10/25/23 EKG showed NSR and no significant changes. She completed the antibiotics on 10/29/23 and reported severe back pain throughout the week until Thursday, mostly at night and then continued discomfort through the weekend. She restarted dabrafenib/trametinib yesterday (10/30/23). The MRI of the lumbar spine was performed on 11/5/23, showing degenerative changes.  11/17/23 EKG showed NSR with no significant changes and ECHO showed LVEF of 63%.  On 11/28/23, pt reported rigor episodes 2-3 times a day, vomiting twice a day, increased fatigue, sleeping 12 hours a day, all day sweats and night chills, Tmax 100.3 on 11/27.  She was advised to take Tylenol prn and hold the medications until symptoms improve. She consulted with a surgeon (Dr. Jones) who recommended appendectomy with or without cecopexy to treat her cecal bascule (Type III cecal volvulus).  She saw Jarred Owen for a second opinion.  Dr. Owen stated her history was inconsistent with appendicitis; however, she may have a mucocele that may need elective removal.  Inguinal ultrasound performed on 11/29/23 was negative for lymphadenopathy in the groin.  She reinitiated therapy on 12/3/23 but then once again developed fevers and rigors and was sent home from work 12/5/23.  She restarted 12/8/23 evening.  Reported rigors lasting 1 hours 3 hours after meds with night sweats and vomiting.  Rigors the next morning 3 hours after the Saturday morning dose.  She had been taking Tylenol and Benadryl and no longer has rigors or night sweats since Sunday.  She reports back pain with some left leg and left hand numbness from the rigors.  She stopped taking treatment on 12/13/23 due to increased back pain, numbness and discomfort affecting sleep.  MRI head was ordered but next available appt is 12/23.  Oxycodone 5mg and gabapentin were prescribed for symptom management and she was to continue Tylenol. She had an appointment scheduled with her neurologist.  She spoke to her neurologist due to severe back pain, altered gait and left arm and left neuropathy and was advised to go to the ED.  She was at [facility] 12/14-12/16. Brain MRI, performed on 12/14/2023, demonstrated a 1.6 cm cavernous malformation in the anterolateral right frontal lobe with white matter disease suggesting chronic sequelae of multiple sclerosis. MRI of the cervical, thoracic and lumbar spine, performed on 12/14/2023, was negative for significant central canal stenosis or cord impingement.  She had a telemed visit with Dr. Miranda on 12/18.  She did not rule out impingement and may schedule an EMG in January.  She also considered possible multiple sclerosis flare-up, severe pain due to strained muscle due to the rigors. She started the gabapentin 300 mg qhs with improvement in her neuropathy of the hands and feet.    PAST MEDICAL HISTORY: Facial numbness, weakness in hands from MS, no treatment  Jan 2022 cardiac stent April 2022 appendicitis treated with ABX Diverticulitis - treated with abx, developed fever, admitted with IV ABX, complicated by c diff, for which she was admitted again.  SOCIAL HISTORY: She works as . 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  Family history of breast cancer - aunt, prostate cancer - father, colon cancer - grandmother, thyroid cancer - sister [de-identified] : Since her last visit she resumed dabrafenib/trametinib on 12/26/23 until 1/11/24 when she had recurrent severe rigors and fevers. These symptoms persisted until 1/17/24. Since then she has had gradual improvement in symptoms. She was also diagnosed with metapneumovirus but did not have any associated respiratory symptoms. She does report epigastric burning sensation but denies any melena or hematochezia. Also denies chest pain, SOB, hemoptysis, hematuria.   Last week, we sent patient for anemia workup at [facility]. Iron Panel, B12 and Folate are all WNL. Hgb is 10.1 g/dL, Platelet count is 476 and Calcium is 10.9.

## 2024-01-30 NOTE — REASON FOR VISIT
[Follow-Up Visit] : a follow-up [Home] : at home, [unfilled] , at the time of the visit. [Medical Office: ([facility])___] : at the medical office located in  [Patient] : the patient [Self] : self [This encounter was initiated by telehealth (audio with video) and converted to telephone (audio only) due to technical difficulties.] : This encounter was initiated by telehealth (audio with video) and converted to telephone (audio only) due to technical difficulties.

## 2024-01-30 NOTE — REASON FOR VISIT
[Follow-Up Visit] : a follow-up [Home] : at home, [unfilled] , at the time of the visit. [Medical Office: ([facility])___] : at the medical office located in  [Patient] : the patient [Self] : self [This encounter was initiated by telehealth (audio with video) and converted to telephone (audio only) due to technical difficulties.] : This encounter was initiated by telehealth (audio with video) and converted to telephone (audio only) due to technical difficulties.

## 2024-01-30 NOTE — ASSESSMENT
[FreeTextEntry1] : Mrs. Edwards is a 63 year old female with a history of MS who is now IVAN from a T2aN2a, stage IIIA, melanoma arising from the right ankle since 04/06/2023. Her tumor was BRAF V600E positive.  She initiated therapy with adjuvant dabrafenib and trametinib on 09/22/2023.  Her course has been complicated by the development of abdominal pain thought to be due to a cecal bascule. She saw Jarred Owen for a 2nd opinion who stated that her history is inconsistent with appendicits, however she may have a mucocele which may need elective removal.  She has also had recurrent fevers and rigors necessitating treatment breaks.  At this time, we will begin her on the encorafenib and binimetinib once the drugs are shipped.  We will plan on repeat bloodwork including a CBC, CMP and CPK 2 weeks after starting.  We will repeat bloodwork and an ECG 4 weeks after starting, and then plan on an echocardiogram 6 to 8 weeks after starting.  We will tentatively plan on a repeat telemedicine visit in 1 weeks time to ensure she has started treatment. She is scheduled for surveillance imaging in the near future.

## 2024-01-30 NOTE — HISTORY OF PRESENT ILLNESS
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 Edwards is a 63 year old female with a history of multiple sclerosis, HTN, HLD, CAD s/p stent Jan 2022, appendicitis, diverticulitis presents for continued management of her T2aN2a, stage IIIA, cutaneous melanoma arising from the right ankle.  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 Please see my prior notes for her complex history.  Briefly, she has had no prior history of skin cancer.  She had a right ankle lesion for 12 years which became flat pink and then later started to raise which was bothering her when wearing footwear.  On 2/16/23, she had a shave biopsy of the right ankle which was positive for melanoma, Breslow thickness 1.6 mm, mitotic index less than 1/mm2.  On 4/6/23, she subsequently underwent wide local excision and SLNB with Dr. Mark Villaseñor at [facility] [place]. 2 out of 2 sentinel right inguinal lymph nodes were positive with extranodal extension. No residual melanoma was identified.  PETCT on 5/6/23 showed likely post-surgical changes in the right foot bridge, right inguinal region and a hypermetabolic 1 cm right thyroid nodule.  MR of the brain on 6/13/23 showed indeterminate left parietal scalp lesion and hematoma of the right frontal lobe, however underlying melanoma could not be excluded.  The right thyroid nodule and left parietal lobe lesion were biopsied and were benign.  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  Repeat PETCT on August 14, 2023 showed no new suspicious lesions.  She has been followed by medon Dr. Marline Swenson at [facility]. She deferred adjuvant systemic therapy due to concern of side effects, especially bleeds, heart function.  She was referred by Dr. Yari Miranda for second opinion.  After a long discussion, she decided to proceed with dabrafenib/trametinib and transfer care to us.  She initiated therapy with dabrafenib/trametinib since 9/22/23. Her course has been complicated by recurrent fevers and rigors requiring multiple treatment breaks. She also developed back pain with some left leg and left hand numbness from the rigors.   Oxycodone 5mg and gabapentin were prescribed for symptom management and she was to continue Tylenol.  EMG was performed and was reportedly abnormal. The patient is pending followup with Dr. Miranda in the near future. Since she was last seen here, she started the gabapentin 300 mg qhs with improvement in her neuropathy of the hands and feet.  She has not had any further back pain and her gait has improved.    The patient has initiated therapy with clopidogrel and is discussing with her gastroentrologist the possibility of changing her PPI to famotidine.  She has not yet received the encorafenib and binimetinib.  PAST MEDICAL HISTORY: Facial numbness, weakness in hands from MS, no treatment  Jan 2022 cardiac stent April 2022 appendicitis treated with ABX Diverticulitis - treated with abx, developed fever, admitted with IV ABX, complicated by c diff, for which she was admitted again.  SOCIAL HISTORY: She works as . 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  Family history of breast cancer - aunt, prostate cancer - father, colon cancer - grandmother, thyroid cancer - sister

## 2024-01-30 NOTE — ASSESSMENT
[FreeTextEntry1] : Mrs. Edwards is a 63 year old female with a history of MS who is now IVAN from a T2aN2a, stage IIIA, melanoma arising from the right ankle since 04/06/2023. Her tumor was BRAF V600E positive.  She initiated therapy with adjuvant dabrafenib and trametinib on 09/22/2023.  Her course has been complicated by the development of abdominal pain thought to be due to a cecal bascule. She saw Jarred Owen for a 2nd opinion who stated that her history is inconsistent with appendicits, however she may have a mucocele which may need elective removal.  She has also had recurrent fevers and rigors necessitating treatment breaks.  Simple anemia workup from last week was all within normal limits. Due to patient, having an elevated Calcium, anemia, abnormal albumin/globulin ratio, additional anemia workup will be sent which will include myeloma labs.   We will continue with initiating patient on  encorafenib and binimetinib

## 2024-01-30 NOTE — HISTORY OF PRESENT ILLNESS
[T: ___] : T[unfilled] [N: ___] : N[unfilled] [AJCC Stage: ____] : AJCC Stage: [unfilled] [de-identified] : DIAGNOSIS:  Stage IIIA (T2aN2a) melanoma of right ankle  MOLECULAR FINDINGS: IHC positive PRAME, SOX10, Melan-A BRAF V600E positive  CURRENT THERAPY:   Dabrafenib/trametinib (09/22/2023-present)  INTERVAL HISTORY: Mrs. Edwards is a 63 year old female with a history of multiple sclerosis, HTN, HLD, CAD s/p stent Jan 2022, appendicitis, diverticulitis presents for continued management of her T2aN2a, stage IIIA, cutaneous melanoma arising from the right ankle.  Please see my prior notes for her complex history.  Briefly, she has had no prior history of skin cancer.  She had a right ankle lesion for 12 years which became flat pink and then later started to raise which was bothering her when wearing footwear.  On 2/16/23, she had a shave biopsy of the right ankle which was positive for melanoma, Breslow thickness 1.6 mm, mitotic index less than 1/mm2.  On 4/6/23, she subsequently underwent wide local excision and SLNB with Dr. Mark Villaseñor at [facility]. 2 out of 2 sentinel right inguinal lymph nodes were positive with extranodal extension. No residual melanoma was identified.  PETCT on 5/6/23 showed likely post-surgical changes in the right foot bridge, right inguinal region and a hypermetabolic 1 cm right thyroid nodule.  MR of the brain on 6/13/23 showed indeterminate left parietal scalp lesion and hematoma of the right frontal lobe, however underlying melanoma could not be excluded.  The right thyroid nodule and left parietal lobe lesion were biopsied and were benign.  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  Repeat PETCT on August 14, 2023 showed no new suspicious lesions.  She has been followed by [facility] Dr. Marline Swenson at [facility]. She deferred adjuvant systemic therapy due to concern of side effects, especially bleeds, heart function.  She was referred by Dr. Yari Miranda for second opinion.  After a long discussion, she decided to proceed with dabrafenib/trametinib and transfer care to us.  She has been on dabrafenib/trametinib since 9/22/23. She reported headaches the first two days, took Tylenol x 1 with relief. 10/6/23 EKG showed normal sinus rhythm. She was having mild back pain for 2 days when it increased to 7/10 VAS pain last night. The pain moved to the front and she was concerned about recurrent appendicitis or diverticulitis and went to [facility] ED on 10/23/23. Repeat EKG 10/23/23 showed no significant change. CT abdomen and pelvis showed a fluid-filled and dilated distal appendix (9 mm) and she was prescribed cefdinir and metronidazole. CT of the lumbar spine showed multilevel degenerative changes and questionable soft tissue thickening along the dural layer. She also reported fevers of 101-102.5 with chills and dabrafenib/trametinib were held for the past week.  10/25/23 EKG showed NSR and no significant changes. She completed the antibiotics on 10/29/23 and reported severe back pain throughout the week until Thursday, mostly at night and then continued discomfort through the weekend. She restarted dabrafenib/trametinib yesterday (10/30/23). The MRI of the lumbar spine was performed on 11/5/23, showing degenerative changes.  11/17/23 EKG showed NSR with no significant changes and ECHO showed LVEF of 63%.  On 11/28/23, pt reported rigor episodes 2-3 times a day, vomiting twice a day, increased fatigue, sleeping 12 hours a day, all day sweats and night chills, Tmax 100.3 on 11/27.  She was advised to take Tylenol prn and hold the medications until symptoms improve. She consulted with a surgeon (Dr. Jones) who recommended appendectomy with or without cecopexy to treat her cecal bascule (Type III cecal volvulus).  She saw Jarred Owen for a second opinion.  Dr. Owen stated her history was inconsistent with appendicitis; however, she may have a mucocele that may need elective removal.  Inguinal ultrasound performed on 11/29/23 was negative for lymphadenopathy in the groin.  She reinitiated therapy on 12/3/23 but then once again developed fevers and rigors and was sent home from work 12/5/23.  She restarted 12/8/23 evening.  Reported rigors lasting 1 hours 3 hours after meds with night sweats and vomiting.  Rigors the next morning 3 hours after the Saturday morning dose.  She had been taking Tylenol and Benadryl and no longer has rigors or night sweats since Sunday.  She reports back pain with some left leg and left hand numbness from the rigors.  She stopped taking treatment on 12/13/23 due to increased back pain, numbness and discomfort affecting sleep.  MRI head was ordered but next available appt is 12/23.  Oxycodone 5mg and gabapentin were prescribed for symptom management and she was to continue Tylenol. She had an appointment scheduled with her neurologist.  She spoke to her neurologist due to severe back pain, altered gait and left arm and left neuropathy and was advised to go to the ED.  She was at [facility] 12/14-12/16. Brain MRI, performed on 12/14/2023, demonstrated a 1.6 cm cavernous malformation in the anterolateral right frontal lobe with white matter disease suggesting chronic sequelae of multiple sclerosis. MRI of the cervical, thoracic and lumbar spine, performed on 12/14/2023, was negative for significant central canal stenosis or cord impingement.  She had a telemed visit with Dr. Miranda on 12/18.  She did not rule out impingement and may schedule an EMG in January.  She also considered possible multiple sclerosis flare-up, severe pain due to strained muscle due to the rigors. She started the gabapentin 300 mg qhs with improvement in her neuropathy of the hands and feet.    PAST MEDICAL HISTORY: Facial numbness, weakness in hands from MS, no treatment  Jan 2022 cardiac stent April 2022 appendicitis treated with ABX Diverticulitis - treated with abx, developed fever, admitted with IV ABX, complicated by c diff, for which she was admitted again.  SOCIAL HISTORY: She works as . 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  Family history of breast cancer - aunt, prostate cancer - father, colon cancer - grandmother, thyroid cancer - sister [de-identified] : Since her last visit she resumed dabrafenib/trametinib on 12/26/23 until 1/11/24 when she had recurrent severe rigors and fevers. These symptoms persisted until 1/17/24. Since then she has had gradual improvement in symptoms. She was also diagnosed with metapneumovirus but did not have any associated respiratory symptoms. She does report epigastric burning sensation but denies any melena or hematochezia. Also denies chest pain, SOB, hemoptysis, hematuria.   Last week, we sent patient for anemia workup at [facility]. Iron Panel, B12 and Folate are all WNL. Hgb is 10.1 g/dL, Platelet count is 476 and Calcium is 10.9.

## 2024-01-30 NOTE — REVIEW OF SYSTEMS
[Diarrhea: Grade 0] : Diarrhea: Grade 0 [Negative] : Allergic/Immunologic [FreeTextEntry7] : Epigastric burning sensation

## 2024-02-01 RX ORDER — ENCORAFENIB 75 MG/1
75 CAPSULE ORAL DAILY
Qty: 180 | Refills: 2 | Status: ACTIVE | COMMUNITY
Start: 2024-01-18 | End: 1900-01-01

## 2024-02-01 RX ORDER — BINIMETINIB 15 MG/1
15 TABLET, FILM COATED ORAL
Qty: 180 | Refills: 2 | Status: ACTIVE | COMMUNITY
Start: 2024-01-18 | End: 1900-01-01

## 2024-02-06 ENCOUNTER — APPOINTMENT (OUTPATIENT)
Dept: HEMATOLOGY ONCOLOGY | Facility: CLINIC | Age: 64
End: 2024-02-06

## 2024-02-07 NOTE — ASSESSMENT
[FreeTextEntry1] : Mrs. Edwards is a 63 year old female with a history of MS who is now IVAN from a T2aN2a, stage IIIA, melanoma arising from the right ankle since 04/06/2023. Her tumor was BRAF V600E positive.  She initiated therapy with adjuvant dabrafenib and trametinib on 09/22/2023.  Her course has been complicated by the development of abdominal pain thought to be due to a cecal bascule. She saw Jarred Owen for a 2nd opinion who stated that her history is inconsistent with appendicitis, however she may have a mucocele which may need elective removal.  She has also had recurrent fevers and rigors necessitating treatment breaks.  At this time, we will begin her on the encorafenib and binimetinib once the drugs are shipped.  We will plan on repeat bloodwork including a CBC, CMP and CPK 2 weeks after starting.  We will repeat bloodwork and an ECG 4 weeks after starting, and then plan on an echocardiogram 6 to 8 weeks after starting. She is scheduled for surveillance imaging in the near future.

## 2024-02-07 NOTE — HISTORY OF PRESENT ILLNESS
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 Edwards is a 63 year old female with a history of multiple sclerosis, HTN, HLD, CAD s/p stent Jan 2022, appendicitis, diverticulitis presents for continued management of her T2aN2a, stage IIIA, cutaneous melanoma arising from the right ankle.  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 Please see my prior notes for her complex history.  Briefly, she has had no prior history of skin cancer.  She had a right ankle lesion for 12 years which became flat pink and then later started to raise which was bothering her when wearing footwear.  On 2/16/23, she had a shave biopsy of the right ankle which was positive for melanoma, Breslow thickness 1.6 mm, mitotic index less than 1/mm2.  On 4/6/23, she subsequently underwent wide local excision and SLNB with [place]. Mark Villaseñor [place] [facility] [place] 2 out of 2 sentinel right inguinal lymph nodes were positive with extranodal extension. No residual melanoma was identified.  PETCT on 5/6/23 showed likely post-surgical changes in the right foot bridge, right inguinal region and a hypermetabolic 1 cm right thyroid nodule.  MR of the brain on 6/13/23 showed indeterminate left parietal scalp lesion and hematoma of the right frontal lobe, however underlying melanoma could not be excluded.  The right thyroid nodule and left parietal lobe lesion were biopsied and were benign.  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  Repeat PETCT on August 14, 2023 showed no new suspicious lesions.  She has been followed by medon Dr. Marline Swenson at [facility] [place]. She deferred adjuvant systemic therapy due to concern of side effects, especially bleeds, heart function.  She was referred by Dr. Yari Miranda for second opinion.  After a long discussion, she decided to proceed with dabrafenib/trametinib and transfer care to us.  She initiated therapy with dabrafenib/trametinib since 9/22/23. Her course has been complicated by recurrent fevers and rigors requiring multiple treatment breaks. She also developed back pain with some left leg and left hand numbness from the rigors.   Oxycodone 5mg and gabapentin were prescribed for symptom management and she was to continue Tylenol.  EMG was performed and was reportedly abnormal. The patient is pending followup with Dr. Miranda in the near future. Since she was last seen here, she started the gabapentin 300 mg qhs with improvement in her neuropathy of the hands and feet.  She has not had any further back pain and her gait has improved.    The patient has initiated therapy with clopidogrel and is discussing with her gastroentrologist the possibility of changing her PPI to famotidine.  She has not yet received the encorafenib and binimetinib.  PAST MEDICAL HISTORY: Facial numbness, weakness in hands from MS, no treatment  Jan 2022 cardiac stent April 2022 appendicitis treated with ABX Diverticulitis - treated with abx, developed fever, admitted with IV ABX, complicated by c diff, for which she was admitted again.  SOCIAL HISTORY: She works as . 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  Family history of breast cancer - aunt, prostate cancer - father, colon cancer - grandmother, thyroid cancer - sister [de-identified] : Patient is here today for a follow up visit prior to beginning her new therapy (Encorafenib/ Binimetinib). Patient is awaiting on her Binimetinib, as pharmacy is still processing this request.  Orders for EKG, ECHO and labs ordered.

## 2024-02-08 ENCOUNTER — RESULT REVIEW (OUTPATIENT)
Age: 64
End: 2024-02-08

## 2024-02-17 ENCOUNTER — NON-APPOINTMENT (OUTPATIENT)
Age: 64
End: 2024-02-17

## 2024-02-20 ENCOUNTER — NON-APPOINTMENT (OUTPATIENT)
Age: 64
End: 2024-02-20

## 2024-02-28 ENCOUNTER — NON-APPOINTMENT (OUTPATIENT)
Age: 64
End: 2024-02-28

## 2024-03-05 ENCOUNTER — APPOINTMENT (OUTPATIENT)
Dept: HEMATOLOGY ONCOLOGY | Facility: CLINIC | Age: 64
End: 2024-03-05
Payer: COMMERCIAL

## 2024-03-05 VITALS
RESPIRATION RATE: 18 BRPM | WEIGHT: 233 LBS | DIASTOLIC BLOOD PRESSURE: 87 MMHG | BODY MASS INDEX: 41.29 KG/M2 | OXYGEN SATURATION: 97 % | HEART RATE: 77 BPM | TEMPERATURE: 97.8 F | HEIGHT: 63 IN | SYSTOLIC BLOOD PRESSURE: 133 MMHG

## 2024-03-05 DIAGNOSIS — R53.83 OTHER FATIGUE: ICD-10-CM

## 2024-03-05 DIAGNOSIS — K59.00 CONSTIPATION, UNSPECIFIED: ICD-10-CM

## 2024-03-05 DIAGNOSIS — N39.498 OTHER SPECIFIED URINARY INCONTINENCE: ICD-10-CM

## 2024-03-05 DIAGNOSIS — R32 UNSPECIFIED URINARY INCONTINENCE: ICD-10-CM

## 2024-03-05 PROCEDURE — G2211 COMPLEX E/M VISIT ADD ON: CPT

## 2024-03-05 PROCEDURE — 99215 OFFICE O/P EST HI 40 MIN: CPT

## 2024-03-05 NOTE — HISTORY OF PRESENT ILLNESS
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 Edwards is a 63 year old female with a history of multiple sclerosis, HTN, HLD, CAD s/p stent Jan 2022, appendicitis, diverticulitis presents for continued management of her T2aN2a, stage IIIA, cutaneous melanoma arising from the right ankle.  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 Please see my prior notes for her complex history.  Briefly, she has had no prior history of skin cancer.  She had a right ankle lesion for 12 years which became flat pink and then later started to raise which was bothering her when wearing footwear.  On 2/16/23, she had a shave biopsy of the right ankle which was positive for melanoma, Breslow thickness 1.6 mm, mitotic index less than 1/mm2.  On 4/6/23, she subsequently underwent wide local excision and SLNB with Dr. Mark Villaseñor at [facility] [place]. 2 out of 2 sentinel right inguinal lymph nodes were positive with extranodal extension. No residual melanoma was identified.  PETCT on 5/6/23 showed likely post-surgical changes in the right foot bridge, right inguinal region and a hypermetabolic 1 cm right thyroid nodule.  MR of the brain on 6/13/23 showed indeterminate left parietal scalp lesion and hematoma of the right frontal lobe, however underlying melanoma could not be excluded.  The right thyroid nodule and left parietal lobe lesion were biopsied and were benign.  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  Repeat PETCT on August 14, 2023 showed no new suspicious lesions.  She has been followed by medon Dr. Marline Swenson at [facility]. She deferred adjuvant systemic therapy due to concern of side effects, especially bleeds, heart function.  She was referred by Dr. Yari Miranda for second opinion.  After a long discussion, she decided to proceed with dabrafenib/trametinib and transfer care to us.  She initiated therapy with dabrafenib/trametinib since 9/22/23. Her course has been complicated by recurrent fevers and rigors requiring multiple treatment breaks. She also developed back pain with some left leg and left hand numbness from the rigors.   Oxycodone 5mg and gabapentin were prescribed for symptom management and she was to continue Tylenol.  EMG was performed and was reportedly abnormal. The patient is pending followup with Dr. Miranda in the near future. Since she was last seen here, she started the gabapentin 300 mg qhs with improvement in her neuropathy of the hands and feet.  She has not had any further back pain and her gait has improved.    The patient has initiated therapy with clopidogrel and is discussing with her gastroentrologist the possibility of changing her PPI to famotidine.   Patient is here via telehealth. Since her last visit, the following has occurred: - 2/8/2024: PET performed no PET/CT evidence of recurrent melanoma. - 2/17/2024: Spoke to the fellow and reported that after her first dose of encorafenib and binimetinib she felt dehydrated, exhausted, developed a headache and had a temp of 999 with rigors. Patient was educated to hold medication and restart the following day.  - 2/20/2024: Patient reported that she was experiencing dry mouth, patient was educated to increase po intake. We also discussed her PET results.  - 2/28/2024: Patient reported that she continues to experience a cough, nausea and fatigue x 1 week.  - 3/1/2024: [facility] RSV panel negative. [facility] labs pending.  - 3/3/2024: MRI Lumbar Spine revealed degenerative changes of the lower lumbar spine most prominent at L4-5 with there is a 2 mm anterolisthesis and severe bilateral facet arthrosis causing mild canal stenosis, mild bilateral stenosis, and mild to moderate bilateral foraminal stenosis.  -3/4/2024: Switched back to Plavix ad esomeprozole.  - 3/5/2024: Today we discussed dose reducing her medication, patient is not interested in lowering dose, and does not want to continue with medication. Patient reports incontinence of bladder and constipation. She is also c/o significant fatigue.   PAST MEDICAL HISTORY: Facial numbness, weakness in hands from MS, no treatment  Jan 2022 cardiac stent April 2022 appendicitis treated with ABX Diverticulitis - treated with abx, developed fever, admitted with IV ABX, complicated by c diff, for which she was admitted again.  SOCIAL HISTORY: She works as . 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  Family history of breast cancer - aunt, prostate cancer - father, colon cancer - grandmother, thyroid cancer - sister

## 2024-03-05 NOTE — DISCUSSION/SUMMARY
[FreeTextEntry1] : Patient sent email about drug-drug interaction between clopidogrel and esomeprazole.  Advised she can take famotidine, to discuss with her prescribing provider.

## 2024-03-05 NOTE — ASSESSMENT
[FreeTextEntry1] : Mrs. Edwards is a 63 year old female with a history of MS who is now IVAN from a T2aN2a, stage IIIA, melanoma arising from the right ankle since 04/06/2023. Her tumor was BRAF V600E positive.  She initiated therapy with adjuvant dabrafenib and trametinib on 09/22/2023.  Her course has been complicated by the development of abdominal pain thought to be due to a cecal bascule. She saw Jarred Owen for a 2nd opinion who stated that her history is inconsistent with appendicitis, however she may have a mucocele which may need elective removal.  She has also had recurrent fevers and rigors necessitating treatment breaks.  Patient was initiated on encorafenib and binimetinib x 8 days. Patient was unable to tolerate medication.   We will plan on repeat bloodwork including a CBC, CMP and CPK 2 weeks after starting.  We will repeat bloodwork and an ECG 4 weeks after starting, and then plan on an echocardiogram 6 to 8 weeks after starting.  We will tentatively plan on a repeat telemedicine visit in 1 weeks time to ensure she has started treatment. She is scheduled for surveillance imaging in the near future.  Melanoma - Patient would like to follow up with a dermatologist. We are recommending Nitza Collins at [place].  - She stated that she no longer wants to take Enco and Leon, even with dose reduction. We will observe patient at this time. The plan is to perform: - Groin Ultrasound in May with repeat visit after.  - August 2024 - Repeat PET and Groin US.   Anemia - In January, workup revealed iron deficiency anemia. Patient will get IV iron locally. - We will do repeat labs in one month.  Fatigue - Secondary to anemia? Other etiology?  Urinary Incontinence - May be due to constipation.   Constipation - Mendezt reports probioitic use and Miralax use.

## 2024-03-14 ENCOUNTER — NON-APPOINTMENT (OUTPATIENT)
Age: 64
End: 2024-03-14

## 2024-03-22 ENCOUNTER — RESULT REVIEW (OUTPATIENT)
Age: 64
End: 2024-03-22

## 2024-05-14 ENCOUNTER — RESULT REVIEW (OUTPATIENT)
Age: 64
End: 2024-05-14

## 2024-05-28 ENCOUNTER — APPOINTMENT (OUTPATIENT)
Dept: HEMATOLOGY ONCOLOGY | Facility: CLINIC | Age: 64
End: 2024-05-28
Payer: COMMERCIAL

## 2024-05-28 PROCEDURE — 99214 OFFICE O/P EST MOD 30 MIN: CPT

## 2024-05-29 DIAGNOSIS — R76.8 OTHER SPECIFIED ABNORMAL IMMUNOLOGICAL FINDINGS IN SERUM: ICD-10-CM

## 2024-05-29 NOTE — HISTORY OF PRESENT ILLNESS
[T: ___] : T[unfilled] [N: ___] : N[unfilled] [AJCC Stage: ____] : AJCC Stage: [unfilled] [de-identified] : DIAGNOSIS:  Stage IIIA (T2aN2a) melanoma of right ankle  MOLECULAR FINDINGS: IHC positive PRAME, SOX10, Melan-A BRAF V600E positive  CURRENT THERAPY:  None  PRIOR THERAPY: Dabrafenib/trametinib (09/22/2023 - 01/11/2024) complicated by recurrent fevers/rigors Encorafenib/binimetinib 2/2024  INTERVAL HISTORY: Mrs. Edwards is a 63 year old female with a history of multiple sclerosis, HTN, HLD, CAD s/p stent Jan 2022, appendicitis, diverticulitis presents for continued management of her T2aN2a, stage IIIA, cutaneous melanoma arising from the right ankle.  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 Please see my prior notes for her complex history.  Briefly, she has had no prior history of skin cancer.  She had a right ankle lesion for 12 years which became flat pink and then later started to raise which was bothering her when wearing footwear.  On 2/16/23, she had a shave biopsy of the right ankle which was positive for melanoma, Breslow thickness 1.6 mm, mitotic index less than 1/mm2.  On 4/6/23, she subsequently [place] wide local excision [place] [facility] [place] 2 out of 2 sentinel right inguinal lymph nodes were positive with extranodal extension. No residual melanoma was identified.  PETCT on 5/6/23 showed likely post-surgical changes in the right foot bridge, right inguinal region and a hypermetabolic 1 cm right thyroid nodule.  MR of the brain on 6/13/23 showed indeterminate left parietal scalp lesion and hematoma of the right frontal lobe, however underlying melanoma could not be excluded.  The right thyroid nodule and left parietal lobe lesion were biopsied and were benign.  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  Repeat PETCT on August 14, 2023 showed no new suspicious lesions.  [facility] [place] [facility] [place] She deferred adjuvant systemic therapy due to concern of side effects, especially bleeds, heart function.  She was referred by Dr. Yari Miranda for second opinion.  After a long discussion, she decided to proceed with dabrafenib/trametinib and transfer care to us.  She initiated therapy with dabrafenib/trametinib since 9/22/23. Her course has been complicated by recurrent fevers and rigors requiring multiple treatment breaks. She also developed back pain with some left leg and left hand numbness. She continues followup with Dr. Miranda of neurology at [facility].  Due to intolerance of the dabrafenib/trametinib, she was changed to encorafenib and binimetinib in February 2024, but again developed intolerable fevers and rigors. She ultimately opted to discontinue active adjuvant therapy in March 2024 and pursue expectant observation.  Since she was last seen here, she underwent a followup groin ultrasound on 05/14/2024. This study, when compared with a prior dated 03/22/2024, demonstrated no evidence of lymphadenopathy.  Clinically, she is doing well without significant complaint.  PAST MEDICAL HISTORY: Facial numbness, weakness in hands from MS, no treatment  Jan 2022 cardiac stent April 2022 appendicitis treated with ABX Diverticulitis - treated with abx, developed fever, admitted with IV ABX, complicated by c diff, for which she was admitted again.  SOCIAL HISTORY: She works as . 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  Family history of breast cancer - aunt, prostate cancer - father, colon cancer - grandmother, thyroid cancer - sister

## 2024-05-29 NOTE — REASON FOR VISIT
[Home] : at home, [unfilled] , at the time of the visit. [Medical Office: ([facility])___] : at the medical office located in  [Patient] : the patient [Follow-Up Visit] : a follow-up [This encounter was initiated by telehealth (audio with video) and converted to telephone (audio only) due to technical difficulties.] : This encounter was initiated by telehealth (audio with video) and converted to telephone (audio only) due to technical difficulties.

## 2024-05-29 NOTE — ASSESSMENT
[FreeTextEntry1] : Mrs. Edwards is a 63 year old female with a history of MS who is now IVAN from a T2aN2a, stage IIIA, melanoma arising from the right ankle since 04/06/2023. Her tumor was BRAF V600E positive.  She initiated therapy with adjuvant dabrafenib and trametinib on 09/22/2023; her regimen was ultimately changed to encorafenib and binimetinib due to toxicity. However, due to persistent toxicity, she discontinued adjuvant therapy in March 2024 and has been on expectant observation since that time.  Her course has been complicated by the development of abdominal pain thought to be due to a cecal bascule. She saw Jarred Owen for a 2nd opinion who stated that her history is inconsistent with appendicitis, however she may have a mucocele which may need elective removal.    Melanoma -  Continue observation. -  August 2024 - Repeat PET/CT -  Continue skin surveillance with dermatology

## 2024-06-11 ENCOUNTER — APPOINTMENT (OUTPATIENT)
Dept: HEMATOLOGY ONCOLOGY | Facility: CLINIC | Age: 64
End: 2024-06-11

## 2024-06-11 PROCEDURE — G2211 COMPLEX E/M VISIT ADD ON: CPT | Mod: NC,1L

## 2024-06-12 ENCOUNTER — NON-APPOINTMENT (OUTPATIENT)
Age: 64
End: 2024-06-12

## 2024-06-24 DIAGNOSIS — C43.71 MALIGNANT MELANOMA OF RIGHT LOWER LIMB, INCLUDING HIP: ICD-10-CM

## 2024-07-17 ENCOUNTER — RESULT REVIEW (OUTPATIENT)
Age: 64
End: 2024-07-17

## 2024-07-29 ENCOUNTER — RESULT REVIEW (OUTPATIENT)
Age: 64
End: 2024-07-29

## 2024-07-29 ENCOUNTER — APPOINTMENT (OUTPATIENT)
Dept: HEMATOLOGY ONCOLOGY | Facility: CLINIC | Age: 64
End: 2024-07-29
Payer: COMMERCIAL

## 2024-07-29 VITALS
TEMPERATURE: 97.6 F | WEIGHT: 240.31 LBS | DIASTOLIC BLOOD PRESSURE: 93 MMHG | SYSTOLIC BLOOD PRESSURE: 145 MMHG | HEIGHT: 63 IN | RESPIRATION RATE: 16 BRPM | BODY MASS INDEX: 42.58 KG/M2 | HEART RATE: 68 BPM | OXYGEN SATURATION: 97 %

## 2024-07-29 DIAGNOSIS — C43.71 MALIGNANT MELANOMA OF RIGHT LOWER LIMB, INCLUDING HIP: ICD-10-CM

## 2024-07-29 DIAGNOSIS — D47.2 MONOCLONAL GAMMOPATHY: ICD-10-CM

## 2024-07-29 DIAGNOSIS — E66.9 OBESITY, UNSPECIFIED: ICD-10-CM

## 2024-07-29 PROCEDURE — 36415 COLL VENOUS BLD VENIPUNCTURE: CPT

## 2024-07-29 PROCEDURE — 99205 OFFICE O/P NEW HI 60 MIN: CPT

## 2024-07-29 RX ORDER — FERROUS SULFATE 137(45) MG
TABLET, EXTENDED RELEASE ORAL
Refills: 0 | Status: ACTIVE | COMMUNITY

## 2024-07-29 NOTE — ASSESSMENT
[FreeTextEntry1] : 63 year old female with diagnosis of malignant melanoma Stage IIIA (T2aN2a) of right ankle, BRAF positive, s/p 6 months of BRAF targeting adjuvant therapy.  Patient referred for evaluation of elevated light chains.  She also has h/o elevated calcium and low PTH that improved after she stopped vit D and calcium.  Patient is accompanied by her  Mikhail.  We went over the implications of elevated ligth chains, reactive ( polyclonal) vs primary process ( monoclonal).  SPEP c/w reactive process, will obtain ifex. Reviewed that 99 % of MM is ruled out by blood and urine test.  Labs were send for quantitative immunoglobins, immunofixation, B2 microglobulins as well as urine for BJP.  Patient was referred for skeletal survey.  # Bone health- dexa WNL 2 years ago  # Malignant Melanoma - followed by Dr. Duenas.  PETCT  August 2024 reviewed - IVAN.

## 2024-07-29 NOTE — HISTORY OF PRESENT ILLNESS
[de-identified] : Mrs.Pamela Edwards is a 63 year old female who presents to the office for abnormal lab work and hx of melanoma.  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 Edwards is a 63 year old female with a history of multiple sclerosis, HTN, HLD, CAD s/p stent 2022, appendicitis, diverticulitis presents for continued management of her T2aN2a, stage IIIA, cutaneous melanoma arising from the right ankle. 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 Please see my prior notes for her complex history.  Briefly, she has had no prior history of skin cancer. She had a right ankle lesion for 12 years which became flat pink and then later started to raise which was bothering her when wearing footwear. On 23, she had a shave biopsy of the right ankle which was positive for melanoma, Breslow thickness 1.6 mm, mitotic index less than 1/mm2. On 23, she subsequently underwent wide local excision and SLNB with Dr. Mark Villaseñor at [facility] [place]. 2 out of 2 sentinel right inguinal lymph nodes were positive with extranodal extension. No residual melanoma was identified. PETCT on 23 showed likely post-surgical changes in the right foot bridge, right inguinal region and a hypermetabolic 1 cm right thyroid nodule. MR of the brain on 23 showed indeterminate left parietal scalp lesion and hematoma of the right frontal lobe, however underlying melanoma could not be excluded. The right thyroid nodule and left parietal lobe lesion were biopsied and were benign. 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 Repeat PETCT on 2023 showed no new suspicious lesions. She has been followed by [facility] Dr. Marline Swenson at [facility]. She deferred adjuvant systemic therapy due to concern of side effects, especially bleeds, heart function. She was referred by Dr. Yari Miranda for second opinion. After a long discussion, she decided to proceed with dabrafenib/trametinib and transfer care to us.  She initiated therapy with dabrafenib/trametinib since 23. Her course has been complicated by recurrent fevers and rigors requiring multiple treatment breaks. She also developed back pain with some left leg and left hand numbness. She continues followup with Dr. Miranda of neurology at [facility]. Due to intolerance of the dabrafenib/trametinib, she was changed to encorafenib and binimetinib in 2024, but again developed intolerable fevers and rigors. She ultimately opted to discontinue active adjuvant therapy in 2024 and pursue expectant observation.  Since she was last seen here, she underwent a follow-up groin ultrasound on 2024. This study, when compared with a prior dated 2024, demonstrated no evidence of lymphadenopathy. Clinically, she is doing well without significant complaint.   She had recent bloodwork performed on May 17, 2024 which revealed that her kappa light chain, free serum was elevated at 46.9/ Lambda Light chain free: 25.6, kappa/lambda light chain free with ratio: 1.83  Labs from 2024: immunoglobin , M: 192, A: 351: Kappa light chain free serum: 121.7, lambda light chain: 65.3  Social hx:  Smoker: currently smoker: 13-14 year: 2 packs a week ETOH: socially  Illicit Drug: none Work:retired business/   Family hx: MAunt: breast cancer: dx:latest 20s  90s MGM: colon cancer: dx 85 years old:  89s Father: prostate cancer/lung cancer : dx 62  92 Sister: thyroid cancer: dx: 60s   Health Maintenance:  Colonoscopy/EGD:   Mammogram/US:   GYN:  DEXA: 2 years ago

## 2024-07-29 NOTE — HISTORY OF PRESENT ILLNESS
[de-identified] : Mrs.Pamela Edwards is a 63 year old female who presents to the office for abnormal lab work and hx of melanoma.  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 Edwards is a 63 year old female with a history of multiple sclerosis, HTN, HLD, CAD s/p stent 2022, appendicitis, diverticulitis presents for continued management of her T2aN2a, stage IIIA, cutaneous melanoma arising from the right ankle. 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 Please see my prior notes for her complex history.  Briefly, she has had no prior history of skin cancer. She had a right ankle lesion for 12 years which became flat pink and then later started to raise which was bothering her when wearing footwear. On 23, she had a shave biopsy of the right ankle which was positive for melanoma, Breslow thickness 1.6 mm, mitotic index less than 1/mm2. On 23, she subsequently underwent wide local excision and SLNB with Dr. Mark Villaseñor at [facility]. 2 out of 2 sentinel right inguinal lymph nodes were positive with extranodal extension. No residual melanoma was identified. PETCT on 23 showed likely post-surgical changes in the right foot bridge, right inguinal region and a hypermetabolic 1 cm right thyroid nodule. MR of the brain on 23 showed indeterminate left parietal scalp lesion and hematoma of the right frontal lobe, however underlying melanoma could not be excluded. The right thyroid nodule and left parietal lobe lesion were biopsied and were benign. 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 Repeat PETCT on 2023 showed no new suspicious lesions. She has been followed by [facility] Dr. Marline Swenson at [facility]. She deferred adjuvant systemic therapy due to concern of side effects, especially bleeds, heart function. She was referred by Dr. Yari Miranda for second opinion. After a long discussion, she decided to proceed with dabrafenib/trametinib and transfer care to us.  She initiated therapy with dabrafenib/trametinib since 23. Her course has been complicated by recurrent fevers and rigors requiring multiple treatment breaks. She also developed back pain with some left leg and left hand numbness. She continues followup with Dr. Miranda of neurology at [facility]. Due to intolerance of the dabrafenib/trametinib, she was changed to encorafenib and binimetinib in 2024, but again developed intolerable fevers and rigors. She ultimately opted to discontinue active adjuvant therapy in 2024 and pursue expectant observation.  Since she was last seen here, she underwent a follow-up groin ultrasound on 2024. This study, when compared with a prior dated 2024, demonstrated no evidence of lymphadenopathy. Clinically, she is doing well without significant complaint.   She had recent bloodwork performed on May 17, 2024 which revealed that her kappa light chain, free serum was elevated at 46.9/ Lambda Light chain free: 25.6, kappa/lambda light chain free with ratio: 1.83  Labs from 2024: immunoglobin , M: 192, A: 351: Kappa light chain free serum: 121.7, lambda light chain: 65.3  Social hx:  Smoker: currently smoker: 13-14 year: 2 packs a week ETOH: socially  Illicit Drug: none Work:retired business/   Family hx: MAunt: breast cancer: dx:latest 20s  90s MGM: colon cancer: dx 85 years old:  89s Father: prostate cancer/lung cancer : dx 62  92 Sister: thyroid cancer: dx: 60s   Health Maintenance:  Colonoscopy/EGD:   Mammogram/US:   GYN:  DEXA: 2 years ago

## 2024-07-29 NOTE — HISTORY OF PRESENT ILLNESS
[de-identified] : Mrs.Pamela Edwards is a 63 year old female who presents to the office for abnormal lab work and hx of melanoma.  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 Edwards is a 63 year old female with a history of multiple sclerosis, HTN, HLD, CAD s/p stent 2022, appendicitis, diverticulitis presents for continued management of her T2aN2a, stage IIIA, cutaneous melanoma arising from the right ankle. 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 Please see my prior notes for her complex history.  Briefly, she has had no prior history of skin cancer. She had a right ankle lesion for 12 years which became flat pink and then later started to raise which was bothering her when wearing footwear. On 23, she had a shave biopsy of the right ankle which was positive for melanoma, Breslow thickness 1.6 mm, mitotic index less than 1/mm2. On 23, she subsequently underwent wide local excision and SLNB with Dr. Mark Villaseñor at [facility]. 2 out of 2 sentinel right inguinal lymph nodes were positive with extranodal extension. No residual melanoma was identified. PETCT on 23 showed likely post-surgical changes in the right foot bridge, right inguinal region and a hypermetabolic 1 cm right thyroid nodule. MR of the brain on 23 showed indeterminate left parietal scalp lesion and hematoma of the right frontal lobe, however underlying melanoma could not be excluded. The right thyroid nodule and left parietal lobe lesion were biopsied and were benign. 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 Repeat PETCT on 2023 showed no new suspicious lesions. She has been followed by [facility] Dr. Malrine Swenson at [facility]. She deferred adjuvant systemic therapy due to concern of side effects, especially bleeds, heart function. She was referred by Dr. Yari Miranda for second opinion. After a long discussion, she decided to proceed with dabrafenib/trametinib and transfer care to us.  She initiated therapy with dabrafenib/trametinib since 23. Her course has been complicated by recurrent fevers and rigors requiring multiple treatment breaks. She also developed back pain with some left leg and left hand numbness. She continues followup with Dr. Miranda of neurology at [facility]. Due to intolerance of the dabrafenib/trametinib, she was changed to encorafenib and binimetinib in 2024, but again developed intolerable fevers and rigors. She ultimately opted to discontinue active adjuvant therapy in 2024 and pursue expectant observation.  Since she was last seen here, she underwent a follow-up groin ultrasound on 2024. This study, when compared with a prior dated 2024, demonstrated no evidence of lymphadenopathy. Clinically, she is doing well without significant complaint.   She had recent bloodwork performed on May 17, 2024 which revealed that her kappa light chain, free serum was elevated at 46.9/ Lambda Light chain free: 25.6, kappa/lambda light chain free with ratio: 1.83  Labs from 2024: immunoglobin , M: 192, A: 351: Kappa light chain free serum: 121.7, lambda light chain: 65.3  Social hx:  Smoker: currently smoker: 13-14 year: 2 packs a week ETOH: socially  Illicit Drug: none Work:retired business/   Family hx: MAunt: breast cancer: dx:latest 20s  90s MGM: colon cancer: dx 85 years old:  89s Father: prostate cancer/lung cancer : dx 62  92 Sister: thyroid cancer: dx: 60s   Health Maintenance:  Colonoscopy/EGD:   Mammogram/US:   GYN:  DEXA: 2 years ago

## 2024-08-01 ENCOUNTER — NON-APPOINTMENT (OUTPATIENT)
Age: 64
End: 2024-08-01

## 2024-09-03 ENCOUNTER — RESULT REVIEW (OUTPATIENT)
Age: 64
End: 2024-09-03

## 2024-09-03 ENCOUNTER — APPOINTMENT (OUTPATIENT)
Dept: HEMATOLOGY ONCOLOGY | Facility: CLINIC | Age: 64
End: 2024-09-03
Payer: COMMERCIAL

## 2024-09-03 VITALS
SYSTOLIC BLOOD PRESSURE: 138 MMHG | BODY MASS INDEX: 42.6 KG/M2 | DIASTOLIC BLOOD PRESSURE: 79 MMHG | WEIGHT: 240.44 LBS | HEART RATE: 63 BPM | OXYGEN SATURATION: 98 % | RESPIRATION RATE: 16 BRPM | TEMPERATURE: 97.4 F | HEIGHT: 63 IN

## 2024-09-03 DIAGNOSIS — D50.0 IRON DEFICIENCY ANEMIA SECONDARY TO BLOOD LOSS (CHRONIC): ICD-10-CM

## 2024-09-03 DIAGNOSIS — C43.71 MALIGNANT MELANOMA OF RIGHT LOWER LIMB, INCLUDING HIP: ICD-10-CM

## 2024-09-03 DIAGNOSIS — R53.83 OTHER FATIGUE: ICD-10-CM

## 2024-09-03 PROCEDURE — G2211 COMPLEX E/M VISIT ADD ON: CPT | Mod: NC

## 2024-09-03 PROCEDURE — 99213 OFFICE O/P EST LOW 20 MIN: CPT

## 2024-09-03 PROCEDURE — 36415 COLL VENOUS BLD VENIPUNCTURE: CPT

## 2024-09-03 PROCEDURE — 99214 OFFICE O/P EST MOD 30 MIN: CPT

## 2024-09-03 NOTE — HISTORY OF PRESENT ILLNESS
[de-identified] : Mrs.Pamela Edwards is a 63 year old female who presents to the office for abnormal lab work and hx of melanoma.  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 Edwards is a 63 year old female with a history of multiple sclerosis, HTN, HLD, CAD s/p stent 2022, appendicitis, diverticulitis presents for continued management of her T2aN2a, stage IIIA, cutaneous melanoma arising from the right ankle. 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 Please see my prior notes for her complex history.  Briefly, she has had no prior history of skin cancer. She had a right ankle lesion for 12 years which became flat pink and then later started to raise which was bothering her when wearing footwear. On 23, she had a shave biopsy of the right ankle which was positive for melanoma, Breslow thickness 1.6 mm, mitotic index less than 1/mm2. On 23, she subsequently underwent wide local excision and SLNB with Dr. Mark Villaseñor at [facility]. 2 out of 2 sentinel right inguinal lymph nodes were positive with extranodal extension. No residual melanoma was identified. PETCT on 23 showed likely post-surgical changes in the right foot bridge, right inguinal region and a hypermetabolic 1 cm right thyroid nodule. MR of the brain on 23 showed indeterminate left parietal scalp lesion and hematoma of the right frontal lobe, however underlying melanoma could not be excluded. The right thyroid nodule and left parietal lobe lesion were biopsied and were benign. 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 Repeat PETCT on 2023 showed no new suspicious lesions. She has been followed by [facility] Dr. Marline Swenson at [facility]. She deferred adjuvant systemic therapy due to concern of side effects, especially bleeds, heart function. She was referred by Dr. Yari Miranda for second opinion. After a long discussion, she decided to proceed with dabrafenib/trametinib and transfer care to us.  She initiated therapy with dabrafenib/trametinib since 23. Her course has been complicated by recurrent fevers and rigors requiring multiple treatment breaks. She also developed back pain with some left leg and left hand numbness. She continues followup with Dr. Miranda of neurology at [facility]. Due to intolerance of the dabrafenib/trametinib, she was changed to encorafenib and binimetinib in 2024, but again developed intolerable fevers and rigors. She ultimately opted to discontinue active adjuvant therapy in 2024 and pursue expectant observation.  Since she was last seen here, she underwent a follow-up groin ultrasound on 2024. This study, when compared with a prior dated 2024, demonstrated no evidence of lymphadenopathy. Clinically, she is doing well without significant complaint.   She had recent bloodwork performed on May 17, 2024 which revealed that her kappa light chain, free serum was elevated at 46.9/ Lambda Light chain free: 25.6, kappa/lambda light chain free with ratio: 1.83  Labs from 2024: immunoglobin , M: 192, A: 351: Kappa light chain free serum: 121.7, lambda light chain: 65.3  Social hx:  Smoker: currently smoker: 13-14 year: 2 packs a week ETOH: socially  Illicit Drug: none Work:retired business/   Family hx: MAunt: breast cancer: dx:latest 20s  90s MGM: colon cancer: dx 85 years old:  89s Father: prostate cancer/lung cancer : dx 62  92 Sister: thyroid cancer: dx: 60s   Health Maintenance:  Colonoscopy/EGD:   Mammogram/US:   GYN:  DEXA: 2 years ago [de-identified] : patient her for follow-up. overall feels good. denies pain or discomfort. seen Dr. Henry in july ophthalmogist, on steroid bilateral eye drops. will follow up next week with dr henry.no new imaging done.  off vitamin d3 and calcium dur to elevated creatnine. wants to know when she can restart. labs pending.

## 2024-09-03 NOTE — ASSESSMENT
[FreeTextEntry1] : Mrs. Edwards is a 63 year old female with a history of MS who is now IVAN from a T2aN2a, stage IIIA, melanoma arising from the right ankle since 04/06/2023. Her tumor was BRAF V600E positive.  She initiated therapy with adjuvant dabrafenib and trametinib on 09/22/2023; her regimen was ultimately changed to encorafenib and binimetinib due to toxicity. However, due to persistent toxicity, she discontinued adjuvant therapy in March 2024 and has been on expectant observation since that time.  Her course has been complicated by the development of abdominal pain thought to be due to a cecal bascule. She saw Jarred Owen for a 2nd opinion who stated that her history is inconsistent with appendicitis, however she may have a mucocele which may need elective removal.  She is now being followed by Dr. Ana Watson for her elevated light chains.  At this time, she will continue followup with Dr. Palomares, Dr. Watson, as well as her local dermatologist and endocrinologist. We will continue her on expectant observation, with her next right inguinal ultrasound to be performed in October and her next PET/CT scan to be performed in January.  We will see her back virtually once her ultrasound results are available.  All of her questions and concerns were answered to her satisfaction.

## 2024-09-03 NOTE — HISTORY OF PRESENT ILLNESS
[de-identified] : Mrs.Pamela Edwards is a 63 year old female who presents to the office for abnormal lab work and hx of melanoma.  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 Edwards is a 63 year old female with a history of multiple sclerosis, HTN, HLD, CAD s/p stent 2022, appendicitis, diverticulitis presents for continued management of her T2aN2a, stage IIIA, cutaneous melanoma arising from the right ankle. 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 Please see my prior notes for her complex history.  Briefly, she has had no prior history of skin cancer. She had a right ankle lesion for 12 years which became flat pink and then later started to raise which was bothering her when wearing footwear. On 23, she had a shave biopsy of the right ankle which was positive for melanoma, Breslow thickness 1.6 mm, mitotic index less than 1/mm2. On 23, she subsequently underwent wide local excision and SLNB with Dr. Mark Villaseñor at [facility]. 2 out of 2 sentinel right inguinal lymph nodes were positive with extranodal extension. No residual melanoma was identified. PETCT on 23 showed likely post-surgical changes in the right foot bridge, right inguinal region and a hypermetabolic 1 cm right thyroid nodule. MR of the brain on 23 showed indeterminate left parietal scalp lesion and hematoma of the right frontal lobe, however underlying melanoma could not be excluded. The right thyroid nodule and left parietal lobe lesion were biopsied and were benign. 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 Repeat PETCT on 2023 showed no new suspicious lesions. She has been followed by [facility] Dr. Marline Swenson at [facility]. She deferred adjuvant systemic therapy due to concern of side effects, especially bleeds, heart function. She was referred by Dr. Yari Miranda for second opinion. After a long discussion, she decided to proceed with dabrafenib/trametinib and transfer care to us.  She initiated therapy with dabrafenib/trametinib since 23. Her course has been complicated by recurrent fevers and rigors requiring multiple treatment breaks. She also developed back pain with some left leg and left hand numbness. She continues followup with Dr. Miranda of neurology at [facility]. Due to intolerance of the dabrafenib/trametinib, she was changed to encorafenib and binimetinib in 2024, but again developed intolerable fevers and rigors. She ultimately opted to discontinue active adjuvant therapy in 2024 and pursue expectant observation.  Since she was last seen here, she underwent a follow-up groin ultrasound on 2024. This study, when compared with a prior dated 2024, demonstrated no evidence of lymphadenopathy. Clinically, she is doing well without significant complaint.   She had recent bloodwork performed on May 17, 2024 which revealed that her kappa light chain, free serum was elevated at 46.9/ Lambda Light chain free: 25.6, kappa/lambda light chain free with ratio: 1.83  Labs from 2024: immunoglobin , M: 192, A: 351: Kappa light chain free serum: 121.7, lambda light chain: 65.3  Social hx:  Smoker: currently smoker: 13-14 year: 2 packs a week ETOH: socially  Illicit Drug: none Work:retired business/   Family hx: MAunt: breast cancer: dx:latest 20s  90s MGM: colon cancer: dx 85 years old:  89s Father: prostate cancer/lung cancer : dx 62  92 Sister: thyroid cancer: dx: 60s   Health Maintenance:  Colonoscopy/EGD:   Mammogram/US:   GYN:  DEXA: 2 years ago [de-identified] : patient her for follow-up. overall feels good. denies pain or discomfort. seen Dr. Henry in july ophthalmogist, on steroid bilateral eye drops. will follow up next week with dr henry.no new imaging done.  off vitamin d3 and calcium dur to elevated creatnine. wants to know when she can restart. labs pending.

## 2024-09-03 NOTE — ASSESSMENT
[FreeTextEntry1] : 63 year old female with diagnosis of malignant melanoma Stage IIIA (T2aN2a) of right ankle, BRAF positive, s/p 6 months of BRAF targeting adjuvant therapy.  Patient referred for evaluation of elevated light chains.  She also has h/o elevated calcium and low PTH that improved after she stopped vit D and calcium.  Patient is accompanied by her  Mikhail.  We went over the implications of elevated light chains, reactive ( polyclonal) vs primary process ( monoclonal).  SPEP c/w reactive process, will obtain ifex. Reviewed that 99 % of MM is ruled out by blood and urine test.  Labs were sent for quantitative immunoglobins, immunofixation, B2 microglobulins as well as urine for BJP.    # Elevated kappa chains c/w reactive, normalizing now, 3.53.  Immunofixation negative blood and urine.  <1% chance of Multiple myeloma.  PETCT - no evidence of multiple myeloma.  # Calcium normalized, PTH 47.   # Bone health- dexa WNL 2 years ago  # Malignant Melanoma - followed by Dr. Duenas.  PETCT August 2024 reviewed - IVAN.   # Iron deficiency anemia. Ferritin 53 in January 2024, now 34.  Patient takes oral iron.  GI evaluation - [facility] - Fall 2023, Spring 24 EGD - negative. Change iron PO to every other day.  Repeat ferritin in 3months.  If still low recommend capsule camera. Patient on PPI might decrease absorption of iron.

## 2024-09-03 NOTE — REASON FOR VISIT
[Home] : at home, [unfilled] , at the time of the visit. [Medical Office: ([facility])___] : at the medical office located in  [Patient] : the patient [This encounter was initiated by telehealth (audio with video) and converted to telephone (audio only) due to technical difficulties.] : This encounter was initiated by telehealth (audio with video) and converted to telephone (audio only) due to technical difficulties. [Follow-Up Visit] : a follow-up [Spouse] : spouse

## 2024-09-03 NOTE — REASON FOR VISIT
[Initial Consultation] : an initial consultation [Spouse] : spouse [Follow-Up Visit] : a follow-up [FreeTextEntry2] : free kappa chains

## 2024-10-22 ENCOUNTER — RESULT REVIEW (OUTPATIENT)
Age: 64
End: 2024-10-22

## 2024-10-28 ENCOUNTER — NON-APPOINTMENT (OUTPATIENT)
Age: 64
End: 2024-10-28

## 2024-12-09 ENCOUNTER — RESULT REVIEW (OUTPATIENT)
Age: 64
End: 2024-12-09

## 2024-12-09 ENCOUNTER — APPOINTMENT (OUTPATIENT)
Dept: HEMATOLOGY ONCOLOGY | Facility: CLINIC | Age: 64
End: 2024-12-09
Payer: COMMERCIAL

## 2024-12-09 VITALS
RESPIRATION RATE: 16 BRPM | TEMPERATURE: 96.9 F | OXYGEN SATURATION: 98 % | BODY MASS INDEX: 43.48 KG/M2 | SYSTOLIC BLOOD PRESSURE: 151 MMHG | DIASTOLIC BLOOD PRESSURE: 87 MMHG | HEART RATE: 62 BPM | WEIGHT: 245.38 LBS | HEIGHT: 63 IN

## 2024-12-09 DIAGNOSIS — R53.83 OTHER FATIGUE: ICD-10-CM

## 2024-12-09 DIAGNOSIS — D47.2 MONOCLONAL GAMMOPATHY: ICD-10-CM

## 2024-12-09 DIAGNOSIS — E66.9 OBESITY, UNSPECIFIED: ICD-10-CM

## 2024-12-09 DIAGNOSIS — D50.0 IRON DEFICIENCY ANEMIA SECONDARY TO BLOOD LOSS (CHRONIC): ICD-10-CM

## 2024-12-09 DIAGNOSIS — R76.8 OTHER SPECIFIED ABNORMAL IMMUNOLOGICAL FINDINGS IN SERUM: ICD-10-CM

## 2024-12-09 PROCEDURE — 99213 OFFICE O/P EST LOW 20 MIN: CPT

## 2024-12-09 PROCEDURE — 36415 COLL VENOUS BLD VENIPUNCTURE: CPT

## 2024-12-12 DIAGNOSIS — C43.71 MALIGNANT MELANOMA OF RIGHT LOWER LIMB, INCLUDING HIP: ICD-10-CM

## 2025-01-15 ENCOUNTER — RESULT REVIEW (OUTPATIENT)
Age: 65
End: 2025-01-15

## 2025-01-16 ENCOUNTER — NON-APPOINTMENT (OUTPATIENT)
Age: 65
End: 2025-01-16

## 2025-01-21 ENCOUNTER — APPOINTMENT (OUTPATIENT)
Dept: HEMATOLOGY ONCOLOGY | Facility: CLINIC | Age: 65
End: 2025-01-21
Payer: COMMERCIAL

## 2025-01-21 DIAGNOSIS — C43.71 MALIGNANT MELANOMA OF RIGHT LOWER LIMB, INCLUDING HIP: ICD-10-CM

## 2025-01-21 PROCEDURE — 99214 OFFICE O/P EST MOD 30 MIN: CPT | Mod: 95

## 2025-01-21 PROCEDURE — G2211 COMPLEX E/M VISIT ADD ON: CPT | Mod: NC

## 2025-05-07 ENCOUNTER — RESULT REVIEW (OUTPATIENT)
Age: 65
End: 2025-05-07

## 2025-06-10 ENCOUNTER — RESULT REVIEW (OUTPATIENT)
Age: 65
End: 2025-06-10

## 2025-06-10 ENCOUNTER — APPOINTMENT (OUTPATIENT)
Dept: HEMATOLOGY ONCOLOGY | Facility: CLINIC | Age: 65
End: 2025-06-10
Payer: COMMERCIAL

## 2025-06-10 VITALS
BODY MASS INDEX: 44.31 KG/M2 | DIASTOLIC BLOOD PRESSURE: 90 MMHG | HEART RATE: 57 BPM | OXYGEN SATURATION: 98 % | WEIGHT: 250.06 LBS | SYSTOLIC BLOOD PRESSURE: 162 MMHG | HEIGHT: 63 IN | TEMPERATURE: 96.9 F | RESPIRATION RATE: 16 BRPM

## 2025-06-10 VITALS — SYSTOLIC BLOOD PRESSURE: 144 MMHG | DIASTOLIC BLOOD PRESSURE: 84 MMHG

## 2025-06-10 PROCEDURE — 99213 OFFICE O/P EST LOW 20 MIN: CPT

## 2025-06-10 PROCEDURE — 36415 COLL VENOUS BLD VENIPUNCTURE: CPT

## 2025-07-07 ENCOUNTER — RESULT REVIEW (OUTPATIENT)
Age: 65
End: 2025-07-07